# Patient Record
Sex: MALE | Race: WHITE | Employment: FULL TIME | URBAN - METROPOLITAN AREA
[De-identification: names, ages, dates, MRNs, and addresses within clinical notes are randomized per-mention and may not be internally consistent; named-entity substitution may affect disease eponyms.]

---

## 2017-06-20 ENCOUNTER — OFFICE VISIT (OUTPATIENT)
Dept: FAMILY MEDICINE | Facility: CLINIC | Age: 27
End: 2017-06-20
Payer: COMMERCIAL

## 2017-06-20 VITALS
OXYGEN SATURATION: 97 % | HEART RATE: 52 BPM | DIASTOLIC BLOOD PRESSURE: 80 MMHG | WEIGHT: 249.1 LBS | BODY MASS INDEX: 29.41 KG/M2 | HEIGHT: 77 IN | SYSTOLIC BLOOD PRESSURE: 120 MMHG | TEMPERATURE: 98.1 F

## 2017-06-20 DIAGNOSIS — Z13.6 CARDIOVASCULAR SCREENING; LDL GOAL LESS THAN 160: ICD-10-CM

## 2017-06-20 DIAGNOSIS — B35.6 TINEA CRURIS: ICD-10-CM

## 2017-06-20 DIAGNOSIS — L72.3 INFLAMED EPIDERMOID CYST OF SKIN: ICD-10-CM

## 2017-06-20 DIAGNOSIS — Z00.00 ENCOUNTER FOR ROUTINE ADULT HEALTH EXAMINATION WITHOUT ABNORMAL FINDINGS: Primary | ICD-10-CM

## 2017-06-20 PROCEDURE — 99385 PREV VISIT NEW AGE 18-39: CPT | Performed by: PHYSICIAN ASSISTANT

## 2017-06-20 PROCEDURE — 99212 OFFICE O/P EST SF 10 MIN: CPT | Mod: 25 | Performed by: PHYSICIAN ASSISTANT

## 2017-06-20 PROCEDURE — 80053 COMPREHEN METABOLIC PANEL: CPT | Performed by: PHYSICIAN ASSISTANT

## 2017-06-20 PROCEDURE — 36415 COLL VENOUS BLD VENIPUNCTURE: CPT | Performed by: PHYSICIAN ASSISTANT

## 2017-06-20 PROCEDURE — 80061 LIPID PANEL: CPT | Performed by: PHYSICIAN ASSISTANT

## 2017-06-20 RX ORDER — FLUCONAZOLE 150 MG/1
150 TABLET ORAL
Qty: 2 TABLET | Refills: 0 | Status: SHIPPED | OUTPATIENT
Start: 2017-06-20 | End: 2017-10-27

## 2017-06-20 NOTE — MR AVS SNAPSHOT
"              After Visit Summary   2017    Silas De Santiago    MRN: [id number]           Patient Information     Date Of Birth          1990        Visit Information        Provider Department      2017 11:20 AM Miki Alaniz PA-C [facility]        Today's Diagnoses     Encounter for routine adult health examination without abnormal findings    -  1    Inflamed epidermoid cyst of skin        Tinea cruris        CARDIOVASCULAR SCREENING; LDL GOAL LESS THAN 160           Follow-ups after your visit        Who to contact     If you have questions or need follow up information about today's clinic visit or your schedule please contact [facility] directly at [phone].  Normal or non-critical lab and imaging results will be communicated to you by MyChart, letter or phone within 4 business days after the clinic has received the results. If you do not hear from us within 7 days, please contact the clinic through MyChart or phone. If you have a critical or abnormal lab result, we will notify you by phone as soon as possible.  Submit refill requests through [employer] or call your pharmacy and they will forward the refill request to us. Please allow 3 business days for your refill to be completed.          Additional Information About Your Visit        MyChart Information     [employer] lets you send messages to your doctor, view your test results, renew your prescriptions, schedule appointments and more. To sign up, go to [email or web address] . Click on \"Log in\" on the left side of the screen, which will take you to the Welcome page. Then click on \"Sign up Now\" on the right side of the page.     You will be asked to enter the access code listed below, as well as some personal information. Please follow the directions to create your username and password.     Your access code is: [email or web address]  Expires: 2017 12:18 PM     Your access code will  in 90 days. If " "you need help or a new code, please call your [facility] clinic or [phone].        Care EveryWhere ID     This is your Care EveryWhere ID. This could be used by other organizations to access your [facility] medical records  [id number]        Your Vitals Were     Pulse Temperature Height Pulse Oximetry BMI (Body Mass Index)       52 98.1  F (36.7  C) (Oral) 6' 5\" (1.956 m) 97% 29.54 kg/m2        Blood Pressure from Last 3 Encounters:   06/20/17 120/80    Weight from Last 3 Encounters:   06/20/17 249 lb 1.6 oz (113 kg)              We Performed the Following     Comprehensive metabolic panel     Lipid panel reflex to direct LDL          Today's Medication Changes          These changes are accurate as of: 6/20/17 12:18 PM.  If you have any questions, ask your nurse or doctor.               Start taking these medicines.        Dose/Directions    fluconazole 150 MG tablet   Commonly known as:  DIFLUCAN   Used for:  Tinea cruris   Started by:  Miki Alaniz PA-C        Dose:  150 mg   Take 1 tablet (150 mg) by mouth every 3 days   Quantity:  2 tablet   Refills:  0            Where to get your medicines      These medications were sent to [facility][id number] [place] [address] [address] [address]     Phone:  [phone]     fluconazole 150 MG tablet                Primary Care Provider Office Phone # Fax #    Miki Alaniz PA-C [phone]       [facility] [address]  [facility] [address]        Thank you!     Thank you for choosing [facility]  for your care. Our goal is always to provide you with excellent care. Hearing back from our patients is one way we can continue to improve our services. Please take a few minutes to complete the written survey that you may receive in the mail after your visit with us. Thank you!             Your Updated Medication List - Protect others around you: Learn how to safely use, " store and throw away your medicines at www.disposemymeds.org.          This list is accurate as of: 6/20/17 12:18 PM.  Always use your most recent med list.                   Brand Name Dispense Instructions for use    fluconazole 150 MG tablet    DIFLUCAN    2 tablet    Take 1 tablet (150 mg) by mouth every 3 days

## 2017-06-20 NOTE — NURSING NOTE
"Chief Complaint   Patient presents with     Physical       Initial /80  Pulse 52  Temp 98.1  F (36.7  C) (Oral)  Ht 6' 5\" (1.956 m)  Wt 249 lb 1.6 oz (113 kg)  SpO2 97%  BMI 29.54 kg/m2 Estimated body mass index is 29.54 kg/(m^2) as calculated from the following:    Height as of this encounter: 6' 5\" (1.956 m).    Weight as of this encounter: 249 lb 1.6 oz (113 kg).  Medication Reconciliation: complete   Suresh Holliday CMA        "

## 2017-06-20 NOTE — PROGRESS NOTES
SUBJECTIVE:     CC: Silas De Santiago is an 27 year old male who presents for preventative health visit.     Physical   Annual:     Getting at least 3 servings of Calcium per day::  Yes    Bi-annual eye exam::  Yes    Dental care twice a year::  Yes    Sleep apnea or symptoms of sleep apnea::  None    Diet::  Regular (no restrictions)    Frequency of exercise::  4-5 days/week    Duration of exercise::  Greater than 60 minutes    Taking medications regularly::  Yes    Medication side effects::  Not applicable    Additional concerns today::  YES      Musculoskeletal problem/pain      Duration: 4 months    Description  Location: right shoulder/neck    Intensity:  mild    Accompanying signs and symptoms: lump about dime size that has started to grow in size    History  Previous similar problem: no   Previous evaluation:  none    Precipitating or alleviating factors:  Trauma or overuse: no   Aggravating factors include: none    Therapies tried and outcome: nothing    -Patient notes that he has a new lump along the right shoulder/neck  -At one point was around dime size; grown in size and is painful  -Denies any redness, there is no discharge    Also states he has had jock itch for 2 years has been treated but did not work for him.   He was treated with creams/lotions for a period of time - and then took an oral medication for an extended time        Today's PHQ-2 Score:   PHQ-2 ( 1999 Pf[employer]) 6/20/2017   Q1: Little interest or pleasure in doing things 0   Q2: Feeling down, depressed or hopeless 0   PHQ-2 Score 0   Q1: Little interest or pleasure in doing things Not at all   Q2: Feeling down, depressed or hopeless Not at all   PHQ-2 Score 0       Abuse: Current or Past(Physical, Sexual or Emotional)- No  Do you feel safe in your environment - Yes    Social History   Substance Use Topics     Smoking status: Never Smoker     Smokeless tobacco: Not on file     Alcohol use Yes     The patient does not drink >3 drinks per day  "nor >7 drinks per week.    Last PSA: No results found for: PSA    No results for input(s): CHOL, HDL, LDL, TRIG, CHOLHDLRATIO, NHDL in the last 58756 hours.    Reviewed orders with patient. Reviewed health maintenance and updated orders accordingly - Yes    Reviewed and updated as needed this visit by clinical staff  Tobacco  Allergies  Meds  Med Hx  Surg Hx  Fam Hx  Soc Hx        Reviewed and updated as needed this visit by Provider            ROS:  C: NEGATIVE for fever, chills, change in weight  INTEGUMENTARY/SKIN: lump along the right shoulder/neck; itching, erythematous groin  E: NEGATIVE for vision changes or irritation  ENT: NEGATIVE for ear, mouth and throat problems  R: NEGATIVE for significant cough or SOB  CV: NEGATIVE for chest pain, palpitations or peripheral edema  GI: NEGATIVE for nausea, abdominal pain, heartburn, or change in bowel habits   male: negative for dysuria, hematuria, decreased urinary stream, erectile dysfunction, urethral discharge  M: NEGATIVE for significant arthralgias or myalgia  N: NEGATIVE for weakness, dizziness or paresthesias  P: NEGATIVE for changes in mood or affect    Problem list, Medication list, Allergies, and Medical/Social/Surgical histories reviewed in [facility] and updated as appropriate.  OBJECTIVE:     /80  Pulse 52  Temp 98.1  F (36.7  C) (Oral)  Ht 6' 5\" (1.956 m)  Wt 249 lb 1.6 oz (113 kg)  SpO2 97%  BMI 29.54 kg/m2  EXAM:  GENERAL: healthy, alert and no distress  EYES: Eyes grossly normal to inspection, PERRL and conjunctivae and sclerae normal  HENT: ear canals and TM's normal, nose and mouth without ulcers or lesions  NECK: no adenopathy, no asymmetry, masses, or scars and thyroid normal to palpation  RESP: lungs clear to auscultation - no rales, rhonchi or wheezes  CV: regular rate and rhythm, normal S1 S2, no S3 or S4, no murmur, click or rub, no peripheral edema and peripheral pulses strong  ABDOMEN: soft, nontender, no hepatosplenomegaly, no " "masses and bowel sounds normal   (male): normal male genitalia without lesions or urethral discharge, no hernia  MS: no gross musculoskeletal defects noted, no edema  SKIN: there are erythematous patches along the bilateral groin space. Mild flaking at borders. Along the right posterior neck/shoulder line is a 15mm inflamed, tender, soft mass. No material is able to be expressed on palpitation.   PSYCH: mentation appears normal, affect normal/bright    ASSESSMENT/PLAN:     1. Encounter for routine adult health examination without abnormal findings  28yo male in stable health and as below.   - Lipid panel reflex to direct LDL  - Comprehensive metabolic panel    2. Inflamed epidermoid cyst of skin  I will have him come back tomorrow and see my partner KO to perform I&D. Procedure was thoroughly explained today.     3. Tinea cruris  Reviewed at home cares to help with control. Sounds like he has been treated with long term antifungal, im guessing terbinafine. As this has been not susceptible to topical tx, will trial diflucan x two. Screening LFT if have to move to stronger. Again, he needs to improve general cares as well.   - Comprehensive metabolic panel  - fluconazole (DIFLUCAN) 150 MG tablet; Take 1 tablet (150 mg) by mouth every 3 days  Dispense: 2 tablet; Refill: 0    4. CARDIOVASCULAR SCREENING; LDL GOAL LESS THAN 160  - Lipid panel reflex to direct LDL  - Comprehensive metabolic panel    COUNSELING:   Reviewed preventive health counseling, as reflected in patient instructions       Regular exercise       Healthy diet/nutrition         reports that he has never smoked. He does not have any smokeless tobacco history on file.    Estimated body mass index is 29.54 kg/(m^2) as calculated from the following:    Height as of this encounter: 6' 5\" (1.956 m).    Weight as of this encounter: 249 lb 1.6 oz (113 kg).   [facility]. Discussed lean eating and exercise - he is a regular " evans.     Counseling Resources:  ATP IV Guidelines  Pooled Cohorts Equation Calculator  FRAX Risk Assessment  ICSI Preventive Guidelines  Dietary Guidelines for Americans, 2010  USDA's MyPlate  ASA Prophylaxis  Lung CA Screening    Miki Alaniz PA-C  [facility]swers for HPI/ROS submitted by the patient on 6/20/2017   PHQ-2 Score: 0    Answers for HPI/ROS submitted by the patient on 6/20/2017   PHQ-2 Score: 0

## 2017-06-21 ENCOUNTER — OFFICE VISIT (OUTPATIENT)
Dept: FAMILY MEDICINE | Facility: CLINIC | Age: 27
End: 2017-06-21
Payer: COMMERCIAL

## 2017-06-21 VITALS
HEIGHT: 77 IN | DIASTOLIC BLOOD PRESSURE: 60 MMHG | TEMPERATURE: 98.6 F | BODY MASS INDEX: 29.4 KG/M2 | OXYGEN SATURATION: 98 % | WEIGHT: 249 LBS | RESPIRATION RATE: 18 BRPM | HEART RATE: 76 BPM | SYSTOLIC BLOOD PRESSURE: 128 MMHG

## 2017-06-21 DIAGNOSIS — Z23 ENCOUNTER FOR IMMUNIZATION: ICD-10-CM

## 2017-06-21 DIAGNOSIS — L02.11 ABSCESS OF NECK: Primary | ICD-10-CM

## 2017-06-21 LAB
ALBUMIN SERPL-MCNC: 4.7 G/DL (ref 3.4–5)
ALP SERPL-CCNC: 62 U/L (ref 40–150)
ALT SERPL W P-5'-P-CCNC: 25 U/L (ref 0–70)
ANION GAP SERPL CALCULATED.3IONS-SCNC: 8 MMOL/L (ref 3–14)
AST SERPL W P-5'-P-CCNC: 30 U/L (ref 0–45)
BILIRUB SERPL-MCNC: 0.6 MG/DL (ref 0.2–1.3)
BUN SERPL-MCNC: 11 MG/DL (ref 7–30)
CALCIUM SERPL-MCNC: 9.5 MG/DL (ref 8.5–10.1)
CHLORIDE SERPL-SCNC: 106 MMOL/L (ref 94–109)
CHOLEST SERPL-MCNC: 182 MG/DL
CO2 SERPL-SCNC: 27 MMOL/L (ref 20–32)
CREAT SERPL-MCNC: 0.86 MG/DL (ref 0.66–1.25)
GFR SERPL CREATININE-BSD FRML MDRD: NORMAL ML/MIN/1.7M2
GLUCOSE SERPL-MCNC: 86 MG/DL (ref 70–99)
HDLC SERPL-MCNC: 45 MG/DL
LDLC SERPL CALC-MCNC: 112 MG/DL
NONHDLC SERPL-MCNC: 137 MG/DL
POTASSIUM SERPL-SCNC: 3.9 MMOL/L (ref 3.4–5.3)
PROT SERPL-MCNC: 7.7 G/DL (ref 6.8–8.8)
SODIUM SERPL-SCNC: 141 MMOL/L (ref 133–144)
TRIGL SERPL-MCNC: 124 MG/DL

## 2017-06-21 PROCEDURE — 90715 TDAP VACCINE 7 YRS/> IM: CPT | Performed by: PHYSICIAN ASSISTANT

## 2017-06-21 PROCEDURE — 90471 IMMUNIZATION ADMIN: CPT | Performed by: PHYSICIAN ASSISTANT

## 2017-06-21 PROCEDURE — 87070 CULTURE OTHR SPECIMN AEROBIC: CPT | Performed by: PHYSICIAN ASSISTANT

## 2017-06-21 PROCEDURE — 87077 CULTURE AEROBIC IDENTIFY: CPT | Performed by: PHYSICIAN ASSISTANT

## 2017-06-21 PROCEDURE — 10060 I&D ABSCESS SIMPLE/SINGLE: CPT | Performed by: PHYSICIAN ASSISTANT

## 2017-06-21 PROCEDURE — 87186 SC STD MICRODIL/AGAR DIL: CPT | Performed by: PHYSICIAN ASSISTANT

## 2017-06-21 RX ORDER — SULFAMETHOXAZOLE/TRIMETHOPRIM 800-160 MG
1 TABLET ORAL 2 TIMES DAILY
Qty: 14 TABLET | Refills: 0 | Status: SHIPPED | OUTPATIENT
Start: 2017-06-21 | End: 2017-10-27

## 2017-06-21 NOTE — MR AVS SNAPSHOT
"              After Visit Summary   2017    Silas De Santiago    MRN: [id number]           Patient Information     Date Of Birth          1990        Visit Information        Provider Department      2017 2:50 PM Janey Wilson PA-C [facility]        Today's Diagnoses     Abscess of neck    -  1       Follow-ups after your visit        Who to contact     If you have questions or need follow up information about today's clinic visit or your schedule please contact [facility] directly at [phone].  Normal or non-critical lab and imaging results will be communicated to you by [employer], letter or phone within 4 business days after the clinic has received the results. If you do not hear from us within 7 days, please contact the clinic through [employer] or phone. If you have a critical or abnormal lab result, we will notify you by phone as soon as possible.  Submit refill requests through [employer] or call your pharmacy and they will forward the refill request to us. Please allow 3 business days for your refill to be completed.          Additional Information About Your Visit        MyChart Information     [employer] lets you send messages to your doctor, view your test results, renew your prescriptions, schedule appointments and more. To sign up, go to [email or web address] . Click on \"Log in\" on the left side of the screen, which will take you to the Welcome page. Then click on \"Sign up Now\" on the right side of the page.     You will be asked to enter the access code listed below, as well as some personal information. Please follow the directions to create your username and password.     Your access code is: [email or web address]  Expires: 2017 12:18 PM     Your access code will  in 90 days. If you need help or a new code, please call your [facility] or [phone].        Care EveryWhere ID     This is your Care EveryWhere ID. This could be used by other " "organizations to access your [facility] medical records  [id number]        Your Vitals Were     Pulse Temperature Respirations Height Pulse Oximetry BMI (Body Mass Index)    76 98.6  F (37  C) (Tympanic) 18 6' 5\" (1.956 m) 98% 29.53 kg/m2       Blood Pressure from Last 3 Encounters:   06/21/17 128/60   06/20/17 120/80    Weight from Last 3 Encounters:   06/21/17 249 lb (112.9 kg)   06/20/17 249 lb 1.6 oz (113 kg)              We Performed the Following     DRAIN SKIN ABSCESS SIMPLE/SINGLE     Wound Culture Aerobic Bacterial          Today's Medication Changes          These changes are accurate as of: 6/21/17  3:28 PM.  If you have any questions, ask your nurse or doctor.               Start taking these medicines.        Dose/Directions    sulfamethoxazole-trimethoprim 800-160 MG per tablet   Commonly known as:  BACTRIM DS/SEPTRA DS   Used for:  Abscess of neck   Started by:  Janey Wilson PA-C        Dose:  1 tablet   Take 1 tablet by mouth 2 times daily   Quantity:  14 tablet   Refills:  0            Where to get your medicines      These medications were sent to [facility][id number] - [place], [address] [address]     Phone:  [phone]     sulfamethoxazole-trimethoprim 800-160 MG per tablet                Primary Care Provider Office Phone # Fax #    Miki Sj Alaniz PA-C [phone]       [facility] [address] [facility] [address]        Equal Access to Services     [facility] AH: Hadii aad ku hadasho Soomaali, waaxda luqadaha, qaybta kaalmada adeegyada, waxay idiin haylyndon mejia. So [facility] [phone].    ATENCIÓN: Si habla español, tiene a chacon disposición servicios gratuitos de asistencia lingüística. Llame al [phone].    We comply with applicable federal civil rights laws and [place] laws. We do not discriminate on the basis of race, color, national origin, age, disability sex, sexual " orientation or gender identity.            Thank you!     Thank you for choosing [facility] [place]  for your care. Our goal is always to provide you with excellent care. Hearing back from our patients is one way we can continue to improve our services. Please take a few minutes to complete the written survey that you may receive in the mail after your visit with us. Thank you!             Your Updated Medication List - Protect others around you: Learn how to safely use, store and throw away your medicines at www.disposemymeds.org.          This list is accurate as of: 6/21/17  3:28 PM.  Always use your most recent med list.                   Brand Name Dispense Instructions for use Diagnosis    fluconazole 150 MG tablet    DIFLUCAN    2 tablet    Take 1 tablet (150 mg) by mouth every 3 days    Tinea cruris       sulfamethoxazole-trimethoprim 800-160 MG per tablet    BACTRIM DS/SEPTRA DS    14 tablet    Take 1 tablet by mouth 2 times daily    Abscess of neck

## 2017-06-21 NOTE — PROGRESS NOTES
"  SUBJECTIVE:                                                    Silas De Santiago is a 27 year old male who presents to clinic today for the following health issues:      Patient here today to discuss I & D for abscess on right shoulder and neck area.     No fever, no discharge.    Problem list and histories reviewed & adjusted, as indicated.  Additional history: as documented    There is no problem list on file for this patient.    Past Surgical History:   Procedure Laterality Date     HC SHOULDER ARTHROSCOPY, DX Right        Social History   Substance Use Topics     Smoking status: Never Smoker     Smokeless tobacco: Not on file     Alcohol use Yes     Family History   Problem Relation Age of Onset     Asthma Father      Childhood Only     Asthma Paternal Grandmother      Asthma Brother      Childhood Only         Current Outpatient Prescriptions   Medication Sig Dispense Refill     sulfamethoxazole-trimethoprim (BACTRIM DS/SEPTRA DS) 800-160 MG per tablet Take 1 tablet by mouth 2 times daily 14 tablet 0     fluconazole (DIFLUCAN) 150 MG tablet Take 1 tablet (150 mg) by mouth every 3 days 2 tablet 0     No Known Allergies    Reviewed and updated as needed this visit by clinical staff       Reviewed and updated as needed this visit by Provider         ROS:  Constitutional, HEENT, cardiovascular, pulmonary, gi and gu systems are negative, except as otherwise noted.    OBJECTIVE:                                                    /60 (BP Location: Right arm, Patient Position: Chair, Cuff Size: Adult Large)  Pulse 76  Temp 98.6  F (37  C) (Tympanic)  Resp 18  Ht 6' 5\" (1.956 m)  Wt 249 lb (112.9 kg)  SpO2 98%  BMI 29.53 kg/m2  Body mass index is 29.53 kg/(m^2).  GENERAL: healthy, alert and no distress  NECK: small nickel sized abscess posterior neck  RESP: lungs clear to auscultation - no rales, rhonchi or wheezes  CV: regular rate and rhythm, normal S1 S2, no S3 or S4, no murmur, click or rub, no " peripheral edema and peripheral pulses strong  MS: no gross musculoskeletal defects noted, no edema    Diagnostic Test Results:  No results found for this or any previous visit (from the past 24 hour(s)).     ASSESSMENT/PLAN:                                                            1. Abscess of neck  New problem, I/D performed today, would culture obtained.   Will cover with Bactrim BID for 7 days.  Advised f/u if signs or symptoms of infection.   - DRAIN SKIN ABSCESS SIMPLE/SINGLE  - Wound Culture Aerobic Bacterial  - sulfamethoxazole-trimethoprim (BACTRIM DS/SEPTRA DS) 800-160 MG per tablet; Take 1 tablet by mouth 2 times daily  Dispense: 14 tablet; Refill: 0    Risks, benefits and alternatives were discussed with patient. Agreeable to the plan of care.      Janey Wilson PA-C  [facility]

## 2017-06-21 NOTE — NURSING NOTE
Screening Questionnaire for Adult Immunization    Are you sick today?   No   Do you have allergies to medications, food, a vaccine component or latex?   No   Have you ever had a serious reaction after receiving a vaccination?   No   Do you have a long-term health problem with heart disease, lung disease, asthma, kidney disease, metabolic disease (e.g. diabetes), anemia, or other blood disorder?   No   Do you have cancer, leukemia, HIV/AIDS, or any other immune system problem?   No   In the past 3 months, have you taken medications that affect  your immune system, such as prednisone, other steroids, or anticancer drugs; drugs for the treatment of rheumatoid arthritis, Crohn s disease, or psoriasis; or have you had radiation treatments?   No   Have you had a seizure, or a brain or other nervous system problem?   No   During the past year, have you received a transfusion of blood or blood     products, or been given immune (gamma) globulin or antiviral drug?   No   For women: Are you pregnant or is there a chance you could become        pregnant during the next month?   No   Have you received any vaccinations in the past 4 weeks?   No     Immunization questionnaire answers were all negative.      MNVFC doesn't apply on this patient    Per orders of Janey Wilson PA-C, injection of Tdap Vaccine given by Angelica Loya. Patient instructed to remain in clinic for 20 minutes afterwards, and to report any adverse reaction to me immediately.       Screening performed by Angelica Loya on 6/21/2017 at 3:35 PM.    Prior to injection verified patient identity using patient's name and date of birth.    Sandra Loya CMA (AAMA) 6/21/2017 3:35 PM

## 2017-06-21 NOTE — NURSING NOTE
"Chief Complaint   Patient presents with     Abscess       Initial /60 (BP Location: Right arm, Patient Position: Chair, Cuff Size: Adult Large)  Pulse 76  Temp 98.6  F (37  C) (Tympanic)  Resp 18  Ht 6' 5\" (1.956 m)  Wt 249 lb (112.9 kg)  SpO2 98%  BMI 29.53 kg/m2 Estimated body mass index is 29.53 kg/(m^2) as calculated from the following:    Height as of this encounter: 6' 5\" (1.956 m).    Weight as of this encounter: 249 lb (112.9 kg).  Medication Reconciliation: complete     Patient would like to be notified at the following phone number for results from this visit   [phone] OK to leave message   Sandra Loya CMA (AAMA) 6/21/2017 3:15 PM      "

## 2017-06-21 NOTE — PROCEDURES
I/D of neck abscess:    Reviewed procedure with patient, consent obtained and signed into the chart.    Effected area cleaned with betadine x 3 then wiped away with alcohol.  1 percent Lidocaine with epineprhine used to infiltrate the area with good anethesia.  Number 11 scapel used to make a stab incision.  Purlent drainage was removed . Small amount of gauze was packed into the wound.   Appropraite wound care dressing applied.  Patient tolerated preocedure well without complication.    Wound culture was obtained.  Started patient on Bactrim BID x 7 days.    Advised f/u in clinic if signs or symptoms of infection occur.    Recheck on wound in clinic in 48 hours.    Janey Wilson PA-C

## 2017-06-23 ENCOUNTER — OFFICE VISIT (OUTPATIENT)
Dept: FAMILY MEDICINE | Facility: CLINIC | Age: 27
End: 2017-06-23
Payer: COMMERCIAL

## 2017-06-23 VITALS
WEIGHT: 258 LBS | DIASTOLIC BLOOD PRESSURE: 76 MMHG | HEIGHT: 77 IN | HEART RATE: 66 BPM | OXYGEN SATURATION: 97 % | BODY MASS INDEX: 30.46 KG/M2 | TEMPERATURE: 98.7 F | SYSTOLIC BLOOD PRESSURE: 116 MMHG

## 2017-06-23 DIAGNOSIS — L02.11 ABSCESS OF NECK: Primary | ICD-10-CM

## 2017-06-23 PROCEDURE — 99024 POSTOP FOLLOW-UP VISIT: CPT | Performed by: PHYSICIAN ASSISTANT

## 2017-06-23 NOTE — PROGRESS NOTES
"  SUBJECTIVE:                                                    Silas De Santiago is a 27 year old male who presents to clinic today for the following health issues:    Patient was seen for neck abscess on 6/21.   I&D'd by JACKIE  Did bleed pretty good the first day.  His wife cleaned the area and changed the bandage yesterday  Denies any further drainage and has no pain.   No fevers or chills    Problem list and histories reviewed & adjusted, as indicated.  Additional history: as documented    There is no problem list on file for this patient.    Past Surgical History:   Procedure Laterality Date     HC SHOULDER ARTHROSCOPY, DX Right        Social History   Substance Use Topics     Smoking status: Never Smoker     Smokeless tobacco: Not on file     Alcohol use Yes     Family History   Problem Relation Age of Onset     Asthma Father      Childhood Only     Asthma Paternal Grandmother      Asthma Brother      Childhood Only           Reviewed and updated as needed this visit by clinical staff  Tobacco  Allergies  Med Hx  Surg Hx  Fam Hx  Soc Hx      Reviewed and updated as needed this visit by Provider         ROS:  Constitutional, HEENT, cardiovascular, pulmonary, gi and gu systems are negative, except as otherwise noted.    OBJECTIVE:     /76  Pulse 66  Temp 98.7  F (37.1  C) (Oral)  Ht 6' 5\" (1.956 m)  Wt 258 lb (117 kg)  SpO2 97%  BMI 30.59 kg/m2  Body mass index is 30.59 kg/(m^2).  GENERAL: healthy, alert and no distress  SKIN: packing removed with some serosanguinous staining. The7mm wound edges were compressed with just a small amount of discharge noted, mostly blood. WOund edges are without erythema or warmth.     Diagnostic Test Results:  none - WOUND CULTURE results still pending    ASSESSMENT/PLAN:     1. Abscess of neck  This wound looks excellent today. Will leave open without packing. Wound care instructions were reviewed. I dont think he needs to return unless with worrisome symptoms. " Follow up per culture.    Miki Alaniz PA-C  [facility]

## 2017-06-23 NOTE — MR AVS SNAPSHOT
"              After Visit Summary   2017    Silas De Santiago    MRN: [id number]           Patient Information     Date Of Birth          1990        Visit Information        Provider Department      2017 4:40 PM M[facility]iz [facility]-C [facility]        Today's Diagnoses     Abscess of neck    -  1       Follow-ups after your visit        Who to contact     If you have questions or need follow up information about today's clinic visit or your schedule please contact [facility] directly at [phone].  Normal or non-critical lab and imaging results will be communicated to you by [employer] Directhart, letter or phone within 4 business days after the clinic has received the results. If you do not hear from us within 7 days, please contact the clinic through Local[employer] Directhart or phone. If you have a critical or abnormal lab result, we will notify you by phone as soon as possible.  Submit refill requests through [employer] or call your pharmacy and they will forward the refill request to us. Please allow 3 business days for your refill to be completed.          Additional Information About Your Visit        MyChart Information     [employer] lets you send messages to your doctor, view your test results, renew your prescriptions, schedule appointments and more. To sign up, go to [email or web address] . Click on \"Log in\" on the left side of the screen, which will take you to the Welcome page. Then click on \"Sign up Now\" on the right side of the page.     You will be asked to enter the access code listed below, as well as some personal information. Please follow the directions to create your username and password.     Your access code is: [email or web address]  Expires: 2017 12:18 PM     Your access code will  in 90 days. If you need help or a new code, please call your [facility] or [phone].        Care EveryWhere ID     This is your Care EveryWhere ID. This could be used by other " "organizations to access your [place] medical records  [id number]        Your Vitals Were     Pulse Temperature Height Pulse Oximetry BMI (Body Mass Index)       66 98.7  F (37.1  C) (Oral) 6' 5\" (1.956 m) 97% 30.59 kg/m2        Blood Pressure from Last 3 Encounters:   06/23/17 116/76   06/21/17 128/60   06/20/17 120/80    Weight from Last 3 Encounters:   06/23/17 258 lb (117 kg)   06/21/17 249 lb (112.9 kg)   06/20/17 249 lb 1.6 oz (113 kg)              Today, you had the following     No orders found for display       Primary Care Provider Office Phone # Fax #    Miki Alaniz PA-C [phone]       [facility] [address] [address] [facility] [address]        Equal Access to Services     [facility] : Hadii aad ku hadasho Soomaali, waaxda luqadaha, qaybta kaalmada adeegyada, waxay idiin hayaan adeeg kharafrancy garcia . So [facility] [phone].    ATENCIÓN: Si habla español, tiene a chacon disposición servicios gratuitos de asistencia lingüística. Llame al [phone].    We comply with applicable federal civil rights laws and [place] laws. We do not discriminate on the basis of race, color, national origin, age, disability sex, sexual orientation or gender identity.            Thank you!     Thank you for choosing [facility]  for your care. Our goal is always to provide you with excellent care. Hearing back from our patients is one way we can continue to improve our services. Please take a few minutes to complete the written survey that you may receive in the mail after your visit with us. Thank you!             Your Updated Medication List - Protect others around you: Learn how to safely use, store and throw away your medicines at www.disposemymeds.org.          This list is accurate as of: 6/23/17  5:07 PM.  Always use your most recent med list.                   Brand Name Dispense Instructions for use Diagnosis    fluconazole 150 MG tablet    DIFLUCAN    2 tablet "    Take 1 tablet (150 mg) by mouth every 3 days    Tinea cruris       sulfamethoxazole-trimethoprim 800-160 MG per tablet    BACTRIM DS/SEPTRA DS    14 tablet    Take 1 tablet by mouth 2 times daily    Abscess of neck

## 2017-06-23 NOTE — NURSING NOTE
"Chief Complaint   Patient presents with     Abscess       Initial /76  Pulse 66  Temp 98.7  F (37.1  C) (Oral)  Ht 6' 5\" (1.956 m)  Wt 258 lb (117 kg)  SpO2 97%  BMI 30.59 kg/m2 Estimated body mass index is 30.59 kg/(m^2) as calculated from the following:    Height as of this encounter: 6' 5\" (1.956 m).    Weight as of this encounter: 258 lb (117 kg).  Medication Reconciliation: complete   Suresh Holliday CMA        "

## 2017-06-25 LAB
BACTERIA SPEC CULT: ABNORMAL
MICRO REPORT STATUS: ABNORMAL
MICROORGANISM SPEC CULT: ABNORMAL
MICROORGANISM SPEC CULT: ABNORMAL
SPECIMEN SOURCE: ABNORMAL

## 2017-06-29 ENCOUNTER — DOCUMENTATION ONLY (OUTPATIENT)
Dept: FAMILY MEDICINE | Facility: CLINIC | Age: 27
End: 2017-06-29

## 2017-10-27 ENCOUNTER — OFFICE VISIT (OUTPATIENT)
Dept: FAMILY MEDICINE | Facility: CLINIC | Age: 27
End: 2017-10-27
Payer: COMMERCIAL

## 2017-10-27 VITALS
HEART RATE: 74 BPM | SYSTOLIC BLOOD PRESSURE: 122 MMHG | OXYGEN SATURATION: 97 % | HEIGHT: 77 IN | TEMPERATURE: 97.6 F | BODY MASS INDEX: 28.87 KG/M2 | WEIGHT: 244.5 LBS | DIASTOLIC BLOOD PRESSURE: 68 MMHG

## 2017-10-27 DIAGNOSIS — D17.0 LIPOMA OF NECK: ICD-10-CM

## 2017-10-27 DIAGNOSIS — M25.561 MEDIAL KNEE PAIN, RIGHT: Primary | ICD-10-CM

## 2017-10-27 PROCEDURE — 99213 OFFICE O/P EST LOW 20 MIN: CPT | Performed by: PHYSICIAN ASSISTANT

## 2017-10-27 RX ORDER — NAPROXEN 500 MG/1
500 TABLET ORAL 2 TIMES DAILY PRN
Qty: 30 TABLET | Refills: 0 | Status: SHIPPED | OUTPATIENT
Start: 2017-10-27 | End: 2021-09-17

## 2017-10-27 NOTE — PROGRESS NOTES
SUBJECTIVE:   Silas De Santiago is a 27 year old male who presents to clinic today for the following health issues:    Musculoskeletal problem/pain      Duration: 5 years    Description  Location: right knee    Intensity:  mild, moderate    Accompanying signs and symptoms: discoloration of knee, pain    History  Previous similar problem: YES- last 5 years  Previous evaluation:  ultrasound    Precipitating or alleviating factors:  Trauma or overuse: no   Aggravating factors include: stairs, running, prolonged walking    Therapies tried and outcome: ice and support wrap    -Patient notes that he has suffered from a 5 year hx of knee pain  -With the symptoms he also develops a knee bruise in the area of pain  -he cant recall a specific injury  -has had evaluated when in [employer], and with [facility]; did an U/S which was normal  -did do some physical therapy on his hips but it didn't help    -overall the pain is medial  -not constant; actually ran a marathon and was ok  -When it is flared, moving up stairs is very bad  -occasional weakness      ABscess      Duration: 4 months    Description (location/character/radiation): 4 months    Intensity:  mild    Accompanying signs and symptoms: change in size (dime)    History (similar episodes/previous evaluation): had one removed in June    Precipitating or alleviating factors: none    Therapies tried and outcome: None     -patient notes new lump along the posterior neckline  -it is very minimally tender  -thinks it is growing  -denies any bleeding or discharge  -had abscess drained previously this year    Problem list and histories reviewed & adjusted, as indicated.  Additional history: as documented    There is no problem list on file for this patient.    Past Surgical History:   Procedure Laterality Date     HC SHOULDER ARTHROSCOPY, DX Right     partial labral tear, debridement and arthroscopy (right)       Social History   Substance Use Topics     Smoking status: Never Smoker      "Smokeless tobacco: Never Used     Alcohol use Yes     Family History   Problem Relation Age of Onset     Asthma Father      Childhood Only     Asthma Paternal Grandmother      Asthma Brother      Childhood Only             Reviewed and updated as needed this visit by clinical staffTobacco  Allergies  Med Hx  Surg Hx  Fam Hx  Soc Hx      Reviewed and updated as needed this visit by Provider         ROS:  Constitutional, HEENT, cardiovascular, pulmonary, gi and gu systems are negative, except as otherwise noted.      OBJECTIVE:   /68 (BP Location: Right arm, Cuff Size: Adult Large)  Pulse 74  Temp 97.6  F (36.4  C) (Oral)  Ht 6' 5\" (1.956 m)  Wt 244 lb 8 oz (110.9 kg)  SpO2 97%  BMI 28.99 kg/m2  Body mass index is 28.99 kg/(m^2).  GENERAL: healthy, alert and no distress  MS: there is mild medial joint tenderness of the right knee. Very small bruising in the tender area. There is negative meniscal testing. No laxity of ligaments. He ambulates without limp. Strength is wnl  SKIN: small, dime sized rubbery lump, mobile, non tender, along the neckline posteriorly; no evidence for infection, redness or warmth    Diagnostic Test Results:  none     ASSESSMENT/PLAN:   1. Medial knee pain, right  Unclear etiology? Bursitis? Given the 5 year hx of intermittent symptoms would like ortho's take, and then consider physical therapy if tolerated. Begin first trial of regimented nsaids.  - ORTHO  REFERRAL  - naproxen (NAPROSYN) 500 MG tablet; Take 1 tablet (500 mg) by mouth 2 times daily as needed for moderate pain  Dispense: 30 tablet; Refill: 0    2. Lipoma of neck  On examination this does not appear to be repeat abscess but more likely irritated lipoma. We'll monitor for now, but if increasing or changing will refer to specialty for intervention     Miki Alaniz PA-C  [facility]  "

## 2017-10-27 NOTE — NURSING NOTE
"Chief Complaint   Patient presents with     Musculoskeletal Problem     Abscess       Initial /68 (BP Location: Right arm, Cuff Size: Adult Large)  Pulse 74  Temp 97.6  F (36.4  C) (Oral)  Ht 6' 5\" (1.956 m)  Wt 244 lb 8 oz (110.9 kg)  SpO2 97%  BMI 28.99 kg/m2 Estimated body mass index is 28.99 kg/(m^2) as calculated from the following:    Height as of this encounter: 6' 5\" (1.956 m).    Weight as of this encounter: 244 lb 8 oz (110.9 kg).  Medication Reconciliation: complete   Suresh Holliday CMA      "

## 2017-10-27 NOTE — MR AVS SNAPSHOT
After Visit Summary   10/27/2017    Silas De Santiago    MRN: [id number]           Patient Information     Date Of Birth          1990        Visit Information        Provider Department      10/27/2017 3:00 PM Miki Alaniz PA-C [facility]        Today's Diagnoses     Medial knee pain, right    -  1    Lipoma of neck           Follow-ups after your visit        Additional Services     ORTHO  REFERRAL       Coverage of these services is subject to the terms and limitations of your health insurance plan. Please call member services at your health plan with any benefit or coverage questions.       [facility] is referring you to the Orthopedic  Services at [facility].       The  representative will assist you in the coordination of your orthopedic and musculoskeletal care as prescribed by your physician.    The  representative will call you within 24 hours or   You may contact the  representative at:   [phone] for [place] and [facility]  [phone] for [facility], and [facility]  [phone] for [facility]    Type of Referral : Non Surgical       Timeframe requested: Routine       If X-rays, CT or MRI's   have been performed, please contact the facility where they were done, to arrange for  prior to your scheduled appointment. Please bring this referral request to your appointment and present it to your specialist.                  Who to contact     If you have questions or need follow up information about today's clinic visit or your schedule please contact [facility] directly at [phone].  Normal or non-critical lab and imaging results will be communicated to you by MyChart, letter or phone within 4 business days after the clinic has received the results. If you do not hear from us within 7 days, please contact the clinic through MyChart or phone.  "If you have a critical or abnormal lab result, we will notify you by phone as soon as possible.  Submit refill requests through [employer] or call your pharmacy and they will forward the refill request to us. Please allow 3 business days for your refill to be completed.          Additional Information About Your Visit        Iss[employer] Solutions[employer] Information     [employer] lets you send messages to your doctor, view your test results, renew your prescriptions, schedule appointments and more. To sign up, go to [email or web address] . Click on \"Log in\" on the left side of the screen, which will take you to the Welcome page. Then click on \"Sign up Now\" on the right side of the page.     You will be asked to enter the access code listed below, as well as some personal information. Please follow the directions to create your username and password.     Your access code is: [email or web address]  Expires: 2018  3:50 PM     Your access code will  in 90 days. If you need help or a new code, please call your [place] clinic or [phone].        Care EveryWhere ID     This is your Care EveryWhere ID. This could be used by other organizations to access your [place] medical records  [id number]        Your Vitals Were     Pulse Temperature Height Pulse Oximetry BMI (Body Mass Index)       74 97.6  F (36.4  C) (Oral) 6' 5\" (1.956 m) 97% 28.99 kg/m2        Blood Pressure from Last 3 Encounters:   10/27/17 122/68   17 116/76   17 128/60    Weight from Last 3 Encounters:   10/27/17 244 lb 8 oz (110.9 kg)   17 258 lb (117 kg)   17 249 lb (112.9 kg)              We Performed the Following     ORTHO  REFERRAL          Today's Medication Changes          These changes are accurate as of: 10/27/17  3:50 PM.  If you have any questions, ask your nurse or doctor.               Stop taking these medicines if you haven't already. Please contact your care team if you have questions.     fluconazole 150 MG tablet "   Commonly known as:  DIFLUCAN   Stopped by:  Miki Alaniz PA-C           sulfamethoxazole-trimethoprim 800-160 MG per tablet   Commonly known as:  BACTRIM DS/SEPTRA DS   Stopped by:  Miki Alaniz PA-C                    Primary Care Provider Office Phone # Fax #    Miki Alaniz PA-C [phone]       [address] [facility] [address]        Equal Access to Services     [facility]: Hadii aad ku hadasho Soomaali, waaxda luqadaha, qaybta kaalmada adeegyada, waxay idiin hayaan adeeg kharash la'aan . So [facility] [phone].    ATENCIÓN: Si habla español, tiene a chacon disposición servicios gratuitos de asistencia lingüística. Llame al [phone].    We comply with applicable federal civil rights laws and [place] laws. We do not discriminate on the basis of race, color, national origin, age, disability, sex, sexual orientation, or gender identity.            Thank you!     Thank you for choosing [facility]  for your care. Our goal is always to provide you with excellent care. Hearing back from our patients is one way we can continue to improve our services. Please take a few minutes to complete the written survey that you may receive in the mail after your visit with us. Thank you!             Your Updated Medication List - Protect others around you: Learn how to safely use, store and throw away your medicines at www.disposemymeds.org.      Notice  As of 10/27/2017  3:50 PM    You have not been prescribed any medications.

## 2017-11-06 ENCOUNTER — RADIANT APPOINTMENT (OUTPATIENT)
Dept: GENERAL RADIOLOGY | Facility: CLINIC | Age: 27
End: 2017-11-06
Attending: PHYSICAL MEDICINE & REHABILITATION
Payer: COMMERCIAL

## 2017-11-06 ENCOUNTER — OFFICE VISIT (OUTPATIENT)
Dept: ORTHOPEDICS | Facility: CLINIC | Age: 27
End: 2017-11-06
Payer: COMMERCIAL

## 2017-11-06 VITALS — RESPIRATION RATE: 13 BRPM | WEIGHT: 240 LBS | HEIGHT: 77 IN | BODY MASS INDEX: 28.34 KG/M2

## 2017-11-06 DIAGNOSIS — M25.561 RIGHT MEDIAL KNEE PAIN: ICD-10-CM

## 2017-11-06 DIAGNOSIS — M25.561 CHRONIC PAIN OF RIGHT KNEE: Primary | ICD-10-CM

## 2017-11-06 DIAGNOSIS — G89.29 CHRONIC PAIN OF RIGHT KNEE: Primary | ICD-10-CM

## 2017-11-06 PROCEDURE — 99204 OFFICE O/P NEW MOD 45 MIN: CPT | Performed by: PHYSICAL MEDICINE & REHABILITATION

## 2017-11-06 PROCEDURE — 73562 X-RAY EXAM OF KNEE 3: CPT | Performed by: PHYSICAL MEDICINE & REHABILITATION

## 2017-11-06 NOTE — Clinical Note
Dear Silas Styles saw me at [facility] on Nov 6, 2017.  Please refer to the visit note at your convenience and feel free to contact me should you have any questions.  Sincerely,  Kolton Berman DO, [facility]

## 2017-11-06 NOTE — MR AVS SNAPSHOT
After Visit Summary   11/6/2017    Silas De Santiago    MRN: [id number]           Patient Information     Date Of Birth          1990        Visit Information        Provider Department      11/6/2017 5:00 PM Kolton Berman DO [facility]        Today's Diagnoses     Chronic pain of right knee    -  1      Care Instructions    We addressed the following today:    1. Right knee pain    Activity modification as discussed  Topical Treatments: Ice  Over the counter medication: Acetaminophen (Tylenol) 1000 mg every 6 hours with food (Maximum of 3000 mg/day)  Ibuprofen (Advil) maximum of 800 mg four times a day with food  MRI at [facility] - call [phone] to schedule  Follow up 1-2 business days after completion of further diagnostic imaging for discussion of results/further medical care (sooner if needed; call direct clinic number [[phone]] at any time with questions or concerns)              Follow-ups after your visit        Future tests that were ordered for you today     Open Future Orders        Priority Expected Expires Ordered    MR Knee Right w/o Contrast Routine  11/6/2018 11/6/2017            Who to contact     If you have questions or need follow up information about today's clinic visit or your schedule please contact [facility] directly at [phone].  Normal or non-critical lab and imaging results will be communicated to you by MyChart, letter or phone within 4 business days after the clinic has received the results. If you do not hear from us within 7 days, please contact the clinic through MyChart or phone. If you have a critical or abnormal lab result, we will notify you by phone as soon as possible.  Submit refill requests through [employer] or call your pharmacy and they will forward the refill request to us. Please allow 3 business days for your refill to be completed.          Additional Information About Your Visit        [facility]  "Information     Auth0 lets you send messages to your doctor, view your test results, renew your prescriptions, schedule appointments and more. To sign up, go to [email or web address] . Click on \"Log in\" on the left side of the screen, which will take you to the Welcome page. Then click on \"Sign up Now\" on the right side of the page.     You will be asked to enter the access code listed below, as well as some personal information. Please follow the directions to create your username and password.     Your access code is: [email or web address]  Expires: 2018  2:50 PM     Your access code will  in 90 days. If you need help or a new code, please call your [facility] clinic or [phone].        Care EveryWhere ID     This is your Care EveryWhere ID. This could be used by other organizations to access your [facility] medical records  [id number]        Your Vitals Were     Respirations Height BMI (Body Mass Index)             13 6' 5\" (1.956 m) 28.46 kg/m2          Blood Pressure from Last 3 Encounters:   10/27/17 122/68   17 116/76   17 128/60    Weight from Last 3 Encounters:   17 240 lb (108.9 kg)   10/27/17 244 lb 8 oz (110.9 kg)   17 258 lb (117 kg)               Primary Care Provider Office Phone # Fax #    Miki Sj Alaniz PA-C [phone]       [address] [facility] [address]        Equal Access to Services     [facility]: Hadii fabiola ku hadasho Soomaali, waaxda luqadaha, qaybta kaalmada kojo gatica . So [facility] [phone].    ATENCIÓN: Si habla español, tiene a chacon disposición servicios gratuitos de asistencia lingüística. Llame al [phone].    We comply with applicable federal civil rights laws and [place] laws. We do not discriminate on the basis of race, color, national origin, age, disability, sex, sexual orientation, or gender identity.            Thank you!     [facility] you for [facility]  for your care. Our goal is always to provide you with excellent care. Hearing back from our patients is one way we can continue to improve our services. Please take a few minutes to complete the written survey that you may receive in the mail after your visit with us. Thank you!             Your Updated Medication List - Protect others around you: Learn how to safely use, store and throw away your medicines at www.disposemymeds.org.          This list is accurate as of: 11/6/17  6:13 PM.  Always use your most recent med list.                   Brand Name Dispense Instructions for use Diagnosis    naproxen 500 MG tablet    NAPROSYN    30 tablet    Take 1 tablet (500 mg) by mouth 2 times daily as needed for moderate pain    Medial knee pain, right

## 2017-11-06 NOTE — NURSING NOTE
"Chief Complaint   Patient presents with     Musculoskeletal Problem     right knee pain       Initial Resp (!) 113  Ht 6' 5\" (1.956 m)  Wt 240 lb (108.9 kg)  BMI 28.46 kg/m2 Estimated body mass index is 28.46 kg/(m^2) as calculated from the following:    Height as of this encounter: 6' 5\" (1.956 m).    Weight as of this encounter: 240 lb (108.9 kg).  Medication Reconciliation: complete       Louie Hager, ATC    "

## 2017-11-06 NOTE — LETTER
"    11/6/2017         RE: Silas De Santiago  [address] [facility] [address]        Dear Colleague,    Thank you for referring your patient, [facility]. Please see a copy of my visit note below.     [facility]   Clinic Visit s Nov 6, 2017    Subjective:  Silas De Santiago is a 27 year old male who is seen in consultation at the request of  Alaniz for evaluation of chronic right knee pain (10+ minutes late for his appointment).    Symptoms began 5+ years ago.  Reports insidious onset without acute precipitating event.  Reports dull right knee pain that is located medial/anterior with radiation absent.  Pain is 5/10 in maximal severity and 0/10 currently.  Symptoms are worse with running, prolonged walking, and with stairs and better with rest.  Other treatment has consisted of ice occasionally and Aleve occasionally with minimal relief.  Denies any consistent weakness, locking, popping, catching, clicking, or swelling of the right knee. Notes bruising of the right knee.  Denies any previous right knee injuries/surgeries.    Patient's past medical, surgical, social, and family histories are reviewed today.  No pertinent significant past medical history  Past Medical History:   Diagnosis Date     NO ACTIVE PROBLEMS        Review of Systems:  Constitutional: NEGATIVE for fever, chills, or change in weight  Skin: NEGATIVE for worrisome rashes, moles, or lesions  Neuro: NEGATIVE for weakness of the right lower extremity  MSK: see HPI  Additional 10 point ROS is negative other than symptoms noted above and in HPI    Objective:  Resp 13  Ht 6' 5\" (1.956 m)  Wt 240 lb (108.9 kg)  BMI 28.46 kg/m2  General: healthy, alert, and in no distress  Skin: no suspicious lesions or rashes  Psych: mentation appears normal and affect normal/bright  HEENT: no scleral icterus  CV: no pedal edema  Resp: normal respiratory effort without conversational dyspnea " LMTCB   Neuro: motor strength as noted below  Lymph: no palpable lymphadenopathy    MSK:    RIGHT KNEE  Inspection:    Bruising present of the medial knee region without erythema  Palpation:    Not tender over the patella tendon, quadriceps insertion, lateral joint line, medial joint line, medial tibial plateau, lateral tibial plateau, medial femoral condyle, lateral femoral condyle, or prepatellar bursa    No effusion is present    Patellofemoral crepitus is absent  Passive Range of Motion:     00 extension to 1350 flexion    Strength:    Quadriceps 5/5; extensor mechanism intact  Special Tests:    Positive: None    Negative: Patellar grind, patellar apprehension, MCL/valgus stress (0 & 30 deg), LCL/varus stress (0 & 30 deg), Lachman's, ballottement, posterior sag, quadriceps active test, Awilda's, Apley's, Thessaly's, single leg squatting, and bounce home    Contralateral knee: no overlying skin change, observable deformity, or effusion    Imaging:  Right knee x-rays (3 views, Parson/sunrise/lateral) were ordered, independent visualization of images was performed, and interpreted in the office today  Impression:   1. Fabella noted of the posterior portion of the right knee.  2. Enthesophyte noted of the superior portion of the right patella.  3. Possible narrowing of the right patellofemoral joint.  4. Negative for fracture, subluxation, or other acute osseous abnormalities.    ASSESSMENT:  1. Chronic right knee pain - differential diagnoses includes plica, medial meniscus tear, chondromalacia patella, etc.    PLAN:  1. MRI of the right knee without contrast at [facility] for further evaluation of current medical state.  2. Activity modification as discussed, including limitation of activities that cause pain/discomfort.  3. Acetaminophen/Ibuprofen/ice as needed for improved pain control.  4. Follow up 1-2 business days after completion of further diagnostic imaging for discussion of results/further medical care.   Instructed to contact our office should the condition evolve or worsen.    Patient's conditions were thoroughly discussed during today's visit with greater than 50% of the visit spent counseling the patient with total time spent face-to-face with the patient being 15 minutes.    Kolton Berman DO, [facility]    Disclaimer: This note consists of symbols derived from keyboarding, dictation and/or voice recognition software. As a result, there may be errors in the script that have gone undetected. Please consider this when interpreting information found in this chart.          Again, thank you for allowing me to participate in the care of your patient.        Sincerely,        Kolton Berman DO

## 2017-11-06 NOTE — PROGRESS NOTES
" [facility]   Clinic Visit s Nov 6, 2017    Subjective:  Silas De Santiago is a 27 year old male who is seen in consultation at the request of  Corbin for evaluation of chronic right knee pain (10+ minutes late for his appointment).    Symptoms began 5+ years ago.  Reports insidious onset without acute precipitating event.  Reports dull right knee pain that is located medial/anterior with radiation absent.  Pain is 5/10 in maximal severity and 0/10 currently.  Symptoms are worse with running, prolonged walking, and with stairs and better with rest.  Other treatment has consisted of ice occasionally and Aleve occasionally with minimal relief.  Denies any consistent weakness, locking, popping, catching, clicking, or swelling of the right knee. Notes bruising of the right knee.  Denies any previous right knee injuries/surgeries.    Patient's past medical, surgical, social, and family histories are reviewed today.  No pertinent significant past medical history  Past Medical History:   Diagnosis Date     NO ACTIVE PROBLEMS        Review of Systems:  Constitutional: NEGATIVE for fever, chills, or change in weight  Skin: NEGATIVE for worrisome rashes, moles, or lesions  Neuro: NEGATIVE for weakness of the right lower extremity  MSK: see HPI  Additional 10 point ROS is negative other than symptoms noted above and in HPI    Objective:  Resp 13  Ht 6' 5\" (1.956 m)  Wt 240 lb (108.9 kg)  BMI 28.46 kg/m2  General: healthy, alert, and in no distress  Skin: no suspicious lesions or rashes  Psych: mentation appears normal and affect normal/bright  HEENT: no scleral icterus  CV: no pedal edema  Resp: normal respiratory effort without conversational dyspnea   Neuro: motor strength as noted below  Lymph: no palpable lymphadenopathy    MSK:    RIGHT KNEE  Inspection:    Bruising present of the medial knee region without erythema  Palpation:    Not tender over the patella tendon, quadriceps insertion, " lateral joint line, medial joint line, medial tibial plateau, lateral tibial plateau, medial femoral condyle, lateral femoral condyle, or prepatellar bursa    No effusion is present    Patellofemoral crepitus is absent  Passive Range of Motion:     00 extension to 1350 flexion    Strength:    Quadriceps 5/5; extensor mechanism intact  Special Tests:    Positive: None    Negative: Patellar grind, patellar apprehension, MCL/valgus stress (0 & 30 deg), LCL/varus stress (0 & 30 deg), Lachman's, ballottement, posterior sag, quadriceps active test, Awilda's, Apley's, Thessaly's, single leg squatting, and bounce home    Contralateral knee: no overlying skin change, observable deformity, or effusion    Imaging:  Right knee x-rays (3 views, Parson/sunrise/lateral) were ordered, independent visualization of images was performed, and interpreted in the office today  Impression:   1. Fabella noted of the posterior portion of the right knee.  2. Enthesophyte noted of the superior portion of the right patella.  3. Possible narrowing of the right patellofemoral joint.  4. Negative for fracture, subluxation, or other acute osseous abnormalities.    ASSESSMENT:  1. Chronic right knee pain - differential diagnoses includes plica, medial meniscus tear, chondromalacia patella, etc.    PLAN:  1. MRI of the right knee without contrast at [facility] for further evaluation of current medical state.  2. Activity modification as discussed, including limitation of activities that cause pain/discomfort.  3. Acetaminophen/Ibuprofen/ice as needed for improved pain control.  4. Follow up 1-2 business days after completion of further diagnostic imaging for discussion of results/further medical care.  Instructed to contact our office should the condition evolve or worsen.    Patient's conditions were thoroughly discussed during today's visit with greater than 50% of the visit spent counseling the patient with total time spent face-to-face with the  patient being 15 minutes.    Kolton Berman DO, CAQSM  [facility]    Disclaimer: This note consists of symbols derived from keyboarding, dictation and/or voice recognition software. As a result, there may be errors in the script that have gone undetected. Please consider this when interpreting information found in this chart.

## 2017-11-07 NOTE — PATIENT INSTRUCTIONS
We addressed the following today:    1. Right knee pain    Activity modification as discussed  Topical Treatments: Ice  Over the counter medication: Acetaminophen (Tylenol) 1000 mg every 6 hours with food (Maximum of 3000 mg/day)  Ibuprofen (Advil) maximum of 800 mg four times a day with food  MRI at [facility] - call [phone] to schedule  Follow up 1-2 business days after completion of further diagnostic imaging for discussion of results/further medical care (sooner if needed; call direct clinic number [[phone]] at any time with questions or concerns)

## 2017-11-15 ENCOUNTER — TRANSFERRED RECORDS (OUTPATIENT)
Dept: HEALTH INFORMATION MANAGEMENT | Facility: CLINIC | Age: 27
End: 2017-11-15

## 2017-11-27 ENCOUNTER — OFFICE VISIT (OUTPATIENT)
Dept: ORTHOPEDICS | Facility: CLINIC | Age: 27
End: 2017-11-27
Payer: COMMERCIAL

## 2017-11-27 VITALS — WEIGHT: 240 LBS | RESPIRATION RATE: 12 BRPM | BODY MASS INDEX: 28.34 KG/M2 | HEIGHT: 77 IN

## 2017-11-27 DIAGNOSIS — M67.51 PLICA OF KNEE, RIGHT: ICD-10-CM

## 2017-11-27 DIAGNOSIS — M25.561 RIGHT MEDIAL KNEE PAIN: Primary | ICD-10-CM

## 2017-11-27 DIAGNOSIS — M23.303 DEGENERATION DISEASE OF MEDIAL MENISCUS OF RIGHT KNEE: ICD-10-CM

## 2017-11-27 PROCEDURE — 99213 OFFICE O/P EST LOW 20 MIN: CPT | Performed by: PHYSICAL MEDICINE & REHABILITATION

## 2017-11-27 NOTE — MR AVS SNAPSHOT
After Visit Summary   11/27/2017    Silas De Santiago    MRN: [id number]           Patient Information     Date Of Birth          1990        Visit Information        Provider Department      11/27/2017 4:20 PM Kolton Berman DO [facility]        Today's Diagnoses     Right medial knee pain    -  1      Care Instructions    We addressed the following today:    1. Right medial knee pain    Activity modification as discussed  Physical therapy: [facility] - [phone]  Topical Treatments: Ice  Over the counter medication: Acetaminophen (Tylenol) 1000 mg every 6 hours with food (Maximum of 3000 mg/day)  Ibuprofen (Advil) maximum of 800 mg four times a day with food  Follow up in 6 weeks if no improvement of symptoms for further evaluation/medical care (sooner if needed; call direct clinic number [[phone]] at any time with questions or concerns)              Follow-ups after your visit        Additional Services     [facility] PT, HAND, AND CHIROPRACTIC REFERRAL       **This order will print in the [facility] Scheduling Office**    Physical Therapy, Hand Therapy and Chiropractic Care are available through:    *[facility]  *[facility]  *[facility]    Call one number to schedule at any of the above locations: [phone].    Your provider has referred you to: Physical Therapy at [facility] or [facility]    Indication/Reason for Referral: Knee Pain  Onset of Illness: Years  Therapy Orders: Evaluate and Treat  Special Programs: None  Special Request: None    Mark Flores      Additional Comments for the Therapist or Chiropractor: Formal physical therapy - exercises to include closed kinetic chain VMO strengthening, hip abductor/adductor/external rotator/internal rotator strengthening, IT band, quadriceps, and hamstring stretching, and core strengthening with use of modalities/taping as appropriate with home  "exercise prescription.     Please be aware that coverage of these services is subject to the terms and limitations of your health insurance plan.  Call member services at your health plan with any benefit or coverage questions.      Please bring the following to your appointment:    *Your personal calendar for scheduling future appointments  *Comfortable clothing                  Who to contact     If you have questions or need follow up information about today's clinic visit or your schedule please contact [facility]IC[facility] directly at [phone].  Normal or non-critical lab and imaging results will be communicated to you by GTxhart, letter or phone within 4 business days after the clinic has received the results. If you do not hear from us within 7 days, please contact the clinic through [employer] or phone. If you have a critical or abnormal lab result, we will notify you by phone as soon as possible.  Submit refill requests through [employer] or call your pharmacy and they will forward the refill request to us. Please allow 3 business days for your refill to be completed.          Additional Information About Your Visit        [employer] Information     [employer] lets you send messages to your doctor, view your test results, renew your prescriptions, schedule appointments and more. To sign up, go to [email or web address] . Click on \"Log in\" on the left side of the screen, which will take you to the Welcome page. Then click on \"Sign up Now\" on the right side of the page.     You will be asked to enter the access code listed below, as well as some personal information. Please follow the directions to create your username and password.     Your access code is: [email or web address]  Expires: 2018  2:50 PM     Your access code will  in 90 days. If you need help or a new code, please call your [place] clinic or [phone].        Care EveryWhere ID     This is your Care EveryWhere ID. This could be used " "by other organizations to access your [facility] medical records  [id number]        Your Vitals Were     Respirations Height BMI (Body Mass Index)             12 6' 5\" (1.956 m) 28.46 kg/m2          Blood Pressure from Last 3 Encounters:   10/27/17 122/68   06/23/17 116/76   06/21/17 128/60    Weight from Last 3 Encounters:   11/27/17 240 lb (108.9 kg)   11/06/17 240 lb (108.9 kg)   10/27/17 244 lb 8 oz (110.9 kg)              We Performed the Following     [facility], HAND, AND CHIROPRACTIC REFERRAL        Primary Care Provider Office Phone # Fax #    Mkii Alaniz PA-C [phone] [address] [address] [facility] [address]        Equal Access to Services     [facility] KWASI : Hadii aad ku hadasho Soomaali, waaxda luqadaha, qaybta kaalmada adeegyada, kojo garcia . So [facility] [phone].    ATENCIÓN: Si habla español, tiene a chacon disposición servicios gratuitos de asistencia lingüística. Jimbo al [phone].    We comply with applicable federal civil rights laws and [place] laws. We do not discriminate on the basis of race, color, national origin, age, disability, sex, sexual orientation, or gender identity.            Thank you!     Thank you for choosing [facility]  for your care. Our goal is always to provide you with excellent care. Hearing back from our patients is one way we can continue to improve our services. Please take a few minutes to complete the written survey that you may receive in the mail after your visit with us. Thank you!             Your Updated Medication List - Protect others around you: Learn how to safely use, store and throw away your medicines at www.disposemymeds.org.          This list is accurate as of: 11/27/17  5:10 PM.  Always use your most recent med list.                   Brand Name Dispense Instructions for use Diagnosis    naproxen 500 MG tablet    NAPROSYN    30 tablet    Take 1 tablet (500 mg) by mouth 2 " times daily as needed for moderate pain    Medial knee pain, right

## 2017-11-27 NOTE — PROGRESS NOTES
" [facility]   Follow-up Visit s Nov 27, 2017    Subjective:  Silas De Santiago is a 27 year old male who is seen in follow up for evaluation of right medial knee pain for discussion of MRI of the right knee results from [facility].  Last visit was on 11/6/2017.  Since that time, symptoms have been unchanged.    Notes right medial knee pain. Denies any weakness, locking, popping, catching, clicking, or swelling of the right knee. Notes bruising of the right knee. Denies any trauma/falls since last clinical visit.    Patient's past medical, surgical, social, and family histories are reviewed today    Objective:  Resp 12  Ht 6' 5\" (1.956 m)  Wt 240 lb (108.9 kg)  BMI 28.46 kg/m2  General: healthy, alert, and in no distress    Imaging:  No x-rays indicated during today's visit  Outside films from [facility] were reviewed today, independent visualization of images was performed, and results were discussed with the patient  MRI EXAMINATION OF THE RIGHT KNEE - 11/16/2017   CONCLUSION:   1. Mild intrasubstance signal within the posterior horn medial meniscus, but without evidence for a medial meniscus tear. This may be related to mild intrasubstance mucoid degeneration.  2. A medial patella plica is present. This appears slightly irregular and may be minimally thickened as well.  3. Articular cartilage of the patellofemoral joint and elsewhere of the knee is normally preserved.  4. No abnormal knee joint effusion. No evidence for bursitis about the knee.  5. No other evidence for internal derangement.     ASSESSMENT:  1. Right medial knee pain - differential diagnoses includes patellofemoral pain syndrome, etc.  2. Right knee plica  3. Right medial meniscus degeneration    PLAN:  1. Formal physical therapy - exercises to include closed kinetic chain VMO strengthening, hip abductor/adductor/external rotator/internal rotator strengthening, IT band, quadriceps, and hamstring stretching, and core strengthening with " use of modalities/taping as appropriate with home exercise prescription.  2. Activity modification as discussed, including limitation of activities that cause pain/discomfort.  3. Acetaminophen/Ibuprofen/ice as needed for improved pain control.  4. Follow-up in 6 weeks for further evaluation/medical care.  If asymptomatic, can follow-up as needed.  Consider referral to orthopedic surgery for consideration of a right knee arthroscopy, a right knee brace, etc. as deemed appropriate moving forward. Instructed to follow-up if change of symptoms arise.    Patient's conditions were thoroughly discussed during today's visit with greater than 50% of the visit spent counseling the patient with total time spent face-to-face with the patient being 15 minutes.    Kolton Berman DO, [facility]  [facility]    Disclaimer: This note consists of symbols derived from keyboarding, dictation and/or voice recognition software. As a result, there may be errors in the script that have gone undetected. Please consider this when interpreting information found in this chart.

## 2017-11-27 NOTE — LETTER
"    11/27/2017         RE: Silas De Santiago  [address]  [facility] [address]        Dear Colleague,    Thank you for referring your patient, Silas De Santiago, to the [facility]. Please see a copy of my visit note below.     [facility]   Follow-up Visit s Nov 27, 2017    Subjective:  Silas De Santiago is a 27 year old male who is seen in follow up for evaluation of right medial knee pain for discussion of MRI of the right knee results from [facility].  Last visit was on 11/6/2017.  Since that time, symptoms have been unchanged.    Notes right medial knee pain. Denies any weakness, locking, popping, catching, clicking, or swelling of the right knee. Notes bruising of the right knee. Denies any trauma/falls since last clinical visit.    Patient's past medical, surgical, social, and family histories are reviewed today    Objective:  Resp 12  Ht 6' 5\" (1.956 m)  Wt 240 lb (108.9 kg)  BMI 28.46 kg/m2  General: healthy, alert, and in no distress    Imaging:  No x-rays indicated during today's visit  Outside films from [facility] were reviewed today, independent visualization of images was performed, and results were discussed with the patient  MRI EXAMINATION OF THE RIGHT KNEE - 11/16/2017   CONCLUSION:   1. Mild intrasubstance signal within the posterior horn medial meniscus, but without evidence for a medial meniscus tear. This may be related to mild intrasubstance mucoid degeneration.  2. A medial patella plica is present. This appears slightly irregular and may be minimally thickened as well.  3. Articular cartilage of the patellofemoral joint and elsewhere of the knee is normally preserved.  4. No abnormal knee joint effusion. No evidence for bursitis about the knee.  5. No other evidence for internal derangement.     ASSESSMENT:  1. Right medial knee pain - differential diagnoses includes patellofemoral pain syndrome, etc.  2. Right knee plica  3. Right medial meniscus " degeneration    PLAN:  1. Formal physical therapy - exercises to include closed kinetic chain VMO strengthening, hip abductor/adductor/external rotator/internal rotator strengthening, IT band, quadriceps, and hamstring stretching, and core strengthening with use of modalities/taping as appropriate with home exercise prescription.  2. Activity modification as discussed, including limitation of activities that cause pain/discomfort.  3. Acetaminophen/Ibuprofen/ice as needed for improved pain control.  4. Follow-up in 6 weeks for further evaluation/medical care.  If asymptomatic, can follow-up as needed.  Consider referral to orthopedic surgery for consideration of a right knee arthroscopy, a right knee brace, etc. as deemed appropriate moving forward. Instructed to follow-up if change of symptoms arise.    Patient's conditions were thoroughly discussed during today's visit with greater than 50% of the visit spent counseling the patient with total time spent face-to-face with the patient being 15 minutes.    Kolton Berman DO, HAYDEN  [facility]    Disclaimer: This note consists of symbols derived from keyboarding, dictation and/or voice recognition software. As a result, there may be errors in the script that have gone undetected. Please consider this when interpreting information found in this chart.        Again, thank you for allowing me to participate in the care of your patient.        Sincerely,        Kolton Berman DO

## 2017-11-27 NOTE — PATIENT INSTRUCTIONS
We addressed the following today:    1. Right medial knee pain    Activity modification as discussed  Physical therapy: [facility] - [phone]  Topical Treatments: Ice  Over the counter medication: Acetaminophen (Tylenol) 1000 mg every 6 hours with food (Maximum of 3000 mg/day)  Ibuprofen (Advil) maximum of 800 mg four times a day with food  Follow up in 6 weeks if no improvement of symptoms for further evaluation/medical care (sooner if needed; call direct clinic number [[phone]] at any time with questions or concerns)

## 2018-06-18 ENCOUNTER — OFFICE VISIT (OUTPATIENT)
Dept: ORTHOPEDICS | Facility: CLINIC | Age: 28
End: 2018-06-18
Payer: COMMERCIAL

## 2018-06-18 VITALS
DIASTOLIC BLOOD PRESSURE: 75 MMHG | WEIGHT: 240 LBS | SYSTOLIC BLOOD PRESSURE: 131 MMHG | BODY MASS INDEX: 28.34 KG/M2 | HEIGHT: 77 IN

## 2018-06-18 DIAGNOSIS — S83.8X1A MENISCAL INJURY, RIGHT, INITIAL ENCOUNTER: Primary | ICD-10-CM

## 2018-06-18 PROCEDURE — 99214 OFFICE O/P EST MOD 30 MIN: CPT | Performed by: FAMILY MEDICINE

## 2018-06-18 NOTE — LETTER
6/18/2018         RE: Silas De Santiago  [address] [address] [facility] [address]        Dear Colleague,    Thank you for referring your patient, Silas De Santiago, to the [facility]. Please see a copy of my visit note below.    HPI   [facility]   Clinic Visit s Jun 18, 2018    PCP: Miki Alaniz Sj Rosen is a 28 year old male who is seen in consultation at the request of Dr. Alaniz for   Chief Complaint   Patient presents with     RECHECK       Injury: Patient describes injury as running a half marathon 1.5 months ago. 2 days ago ran [employer] as well.     No running for 1.5 months prior to Grandmas, feeling nearly normal, improved.         Location of Pain: right knee medial, nonradiating   Duration of Pain: 1.5 month(s)  Rating of Pain at worst: 6/10  Rating of Pain Currently: 3/10  Pain is better with: activity avoidance and rest   Pain is worse with: lateral movement, deep bending, squatting and stair climbing   Treatment so far consists of: brace and Pain medication: ibuprofen (Advil, Motrin)  Associated symptoms: no distal numbness or tingling; denies swelling or warmth  Recent imaging completed: Xrays and MRI 11/6/17  Prior History of related problems: has had knee pain in the past, treated by Dr. Berman    Current pain is different, feels unstable.     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     Past Medical History:   Diagnosis Date     NO ACTIVE PROBLEMS          Family History   Problem Relation Age of Onset     Asthma Father      Childhood Only     Asthma Paternal Grandmother      Asthma Brother      Childhood Only       Social History     Social History     Marital status:      Spouse name: N/A     Number of children: N/A     Years of education: N/A     Social History Main Topics     Smoking status: Never Smoker     Smokeless tobacco: Never Used     Alcohol use Yes     Past Surgical History:  "  Procedure Laterality Date     HC SHOULDER ARTHROSCOPY, DX Right     partial labral tear, debridement and arthroscopy (right)       Review of Systems   Musculoskeletal: Positive for joint pain.   All other systems reviewed and are negative.        Physical Exam   Musculoskeletal:        Right knee: Medial joint line tenderness noted.     /75  Ht 6' 5\" (1.956 m)  Wt 240 lb (108.9 kg)  BMI 28.46 kg/m2  Constitutional:well-developed, well-nourished, and in no distress.   Cardiovascular: Intact distal pulses.    Neurological: alert. Gait Normal:   Gait, station, stance, and balance appear normal for age  Skin: Skin is warm and dry.   Psychiatric: Mood and affect normal.   Respiratory: unlabored, speaks in full sentences  Lymph: no LAD, no lymphangitis          Left Knee Exam   Swelling: None  Effusion: No    Tenderness   None    Range of Motion   Extension: Normal  Flexion:     Normal    Tests   McMurrays:  Medial - Negative      Lateral - Negative  Lachman:  Anterior - Negative    Posterior - Negative  Drawer:       Anterior - Negative     Posterior - Negative  Varus:  Negative  Valgus: Negative  Pivot Shift: Negative  Patellar Apprehension: No    Right Knee Exam   Swelling: Mild  Effusion: Yes    Tenderness   The patient is experiencing tenderness in the medial joint line.    Range of Motion   Extension: Normal  Flexion:     Normal    Tests   McMurrays:  Medial - Positive      Lateral - Negative  Lachman:  Anterior - Negative    Posterior - Negative  Drawer:       Anterior - Negative    Posterior - Negative  Varus:  Negative  Valgus: Negative  Pivot Shift: Negative  Patellar Apprehension: No           EXAM: MRI EXAMINATION OF THE RIGHT KNEE    CLINICAL INFORMATION: Chronic right knee pain. No specific injury. No history of surgery to this area. Anteromedial knee pain. Evaluate plantar, medial meniscus tear, patellar chondromalacia.    TECHNICAL INFORMATION: Multiple proton density images were obtained in the " axial, sagittal and coronal planes, followed by proton density fat suppression images in the axial and sagittal planes. Coronal T2 fat suppression images were also obtained. There are no prior studies available for comparison.    INTERPRETATION:    Bones: No appreciable subchondral edema signal or cystic change. No evidence for occult fracture or osseous contusion. No other abnormal bone marrow edema pattern is identified.    Ligaments and tendons: The medial collateral ligament is intact, without acute sprain or tear. The iliotibial band, fibular collateral ligament, biceps femoris tendon and popliteus tendon all are intact. The anterior cruciate ligament is intact without acute sprain or tear. The posterior cruciate ligament is intact.    Extensor Mechanism: The patellar and quadriceps tendons are intact. The medial and lateral retinacula are intact.    Knee Joint: There is no knee joint effusion. No discrete popliteal cyst. No abnormal soft tissue fluid/edema signal to indicate MRI changes for bursitis about the knee. There is no evidence for a loose body.    Medial Compartment: There is a mild appearance of horizontal intrasubstance signal within the posterior horn medial meniscus. There is no evidence for a medial meniscus tear, though. No displaced flap fragment or parameniscal cyst. There is no focal chondral defect. No other significant changes of chondromalacia.    Lateral Compartment: There is no evidence for discrete lateral meniscal tear. No displaced flap fragment or parameniscal cyst. There is no focal chondral defect. No other significant changes of chondromalacia.    Patellofemoral articulation: There is no focal chondral defect. No other significant chondromalacia. Series 3 images 8 through 12 demonstrate a medial patella plica, which appears slightly irregular and may be minimally thickened.    CONCLUSION:    1. Mild intrasubstance signal within the posterior horn medial meniscus, but without  evidence for a medial meniscus tear. This may be related to mild intrasubstance mucoid degeneration.    2. A medial patella plica is present. This appears slightly irregular and may be minimally thickened as well.    3. Articular cartilage of the patellofemoral joint and elsewhere of the knee is normally preserved.    4. No abnormal knee joint effusion. No evidence for bursitis about the knee.    5. No other evidence for internal derangement.    KES  Electronically signed on 11/16/2017 8:34:00 AM by Neymar Waters M.D.     Right knee x-rays (3 views, Parson/sunrise/lateral)   Impression:   1. Fabella noted of the posterior portion of the right knee.  2. Enthesophyte noted of the superior portion of the right patella.  3. Possible narrowing of the right patellofemoral joint.  4. Negative for fracture, subluxation, or other acute osseous abnormalities.      ASSESSMENT/PLAN    ICD-10-CM    1. Meniscal injury, right, initial encounter S83.8X1A MR Knee Right w/o Contrast       Acute worsening of medial right knee pain indicates probable progression of medial meniscus tear, which was degenerative in nature on November MRI but has progressed.  Will notify via [employer], possible surgical referral if meniscus torn to consider repair    Again, thank you for allowing me to participate in the care of your patient.        Sincerely,        Garland Lipscomb MD

## 2018-06-18 NOTE — PROGRESS NOTES
[facility]   Clinic Visit s Jun 18, 2018    PCP: Miki Alaniz Sj Rosen is a 28 year old male who is seen in consultation at the request of Dr. Alaniz for   Chief Complaint   Patient presents with     RECHECK       Injury: Patient describes injury as running a half marathon 1.5 months ago. 2 days ago ran [employer] marathon as well.     No running for 1.5 months prior to Grandmas, feeling nearly normal, improved.         Location of Pain: right knee medial, nonradiating   Duration of Pain: 1.5 month(s)  Rating of Pain at worst: 6/10  Rating of Pain Currently: 3/10  Pain is better with: activity avoidance and rest   Pain is worse with: lateral movement, deep bending, squatting and stair climbing   Treatment so far consists of: brace and Pain medication: ibuprofen (Advil, Motrin)  Associated symptoms: no distal numbness or tingling; denies swelling or warmth  Recent imaging completed: Xrays and MRI 11/6/17  Prior History of related problems: has had knee pain in the past, treated by Dr. Berman    Current pain is different, feels unstable.     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     Past Medical History:   Diagnosis Date     NO ACTIVE PROBLEMS          Family History   Problem Relation Age of Onset     Asthma Father      Childhood Only     Asthma Paternal Grandmother      Asthma Brother      Childhood Only       Social History     Social History     Marital status:      Spouse name: N/A     Number of children: N/A     Years of education: N/A     Social History Main Topics     Smoking status: Never Smoker     Smokeless tobacco: Never Used     Alcohol use Yes     Past Surgical History:   Procedure Laterality Date     HC SHOULDER ARTHROSCOPY, DX Right     partial labral tear, debridement and arthroscopy (right)       Review of Systems   Musculoskeletal: Positive for joint pain.   All other systems reviewed and are  "negative.        Physical Exam   Musculoskeletal:        Right knee: Medial joint line tenderness noted.     /75  Ht 6' 5\" (1.956 m)  Wt 240 lb (108.9 kg)  BMI 28.46 kg/m2  Constitutional:well-developed, well-nourished, and in no distress.   Cardiovascular: Intact distal pulses.    Neurological: alert. Gait Normal:   Gait, station, stance, and balance appear normal for age  Skin: Skin is warm and dry.   Psychiatric: Mood and affect normal.   Respiratory: unlabored, speaks in full sentences  Lymph: no LAD, no lymphangitis          Left Knee Exam   Swelling: None  Effusion: No    Tenderness   None    Range of Motion   Extension: Normal  Flexion:     Normal    Tests   McMurrays:  Medial - Negative      Lateral - Negative  Lachman:  Anterior - Negative    Posterior - Negative  Drawer:       Anterior - Negative     Posterior - Negative  Varus:  Negative  Valgus: Negative  Pivot Shift: Negative  Patellar Apprehension: No    Right Knee Exam   Swelling: Mild  Effusion: Yes    Tenderness   The patient is experiencing tenderness in the medial joint line.    Range of Motion   Extension: Normal  Flexion:     Normal    Tests   McMurrays:  Medial - Positive      Lateral - Negative  Lachman:  Anterior - Negative    Posterior - Negative  Drawer:       Anterior - Negative    Posterior - Negative  Varus:  Negative  Valgus: Negative  Pivot Shift: Negative  Patellar Apprehension: No           EXAM: MRI EXAMINATION OF THE RIGHT KNEE    CLINICAL INFORMATION: Chronic right knee pain. No specific injury. No history of surgery to this area. Anteromedial knee pain. Evaluate plantar, medial meniscus tear, patellar chondromalacia.    TECHNICAL INFORMATION: Multiple proton density images were obtained in the axial, sagittal and coronal planes, followed by proton density fat suppression images in the axial and sagittal planes. Coronal T2 fat suppression images were also obtained. There are no prior studies available for " comparison.    INTERPRETATION:    Bones: No appreciable subchondral edema signal or cystic change. No evidence for occult fracture or osseous contusion. No other abnormal bone marrow edema pattern is identified.    Ligaments and tendons: The medial collateral ligament is intact, without acute sprain or tear. The iliotibial band, fibular collateral ligament, biceps femoris tendon and popliteus tendon all are intact. The anterior cruciate ligament is intact without acute sprain or tear. The posterior cruciate ligament is intact.    Extensor Mechanism: The patellar and quadriceps tendons are intact. The medial and lateral retinacula are intact.    Knee Joint: There is no knee joint effusion. No discrete popliteal cyst. No abnormal soft tissue fluid/edema signal to indicate MRI changes for bursitis about the knee. There is no evidence for a loose body.    Medial Compartment: There is a mild appearance of horizontal intrasubstance signal within the posterior horn medial meniscus. There is no evidence for a medial meniscus tear, though. No displaced flap fragment or parameniscal cyst. There is no focal chondral defect. No other significant changes of chondromalacia.    Lateral Compartment: There is no evidence for discrete lateral meniscal tear. No displaced flap fragment or parameniscal cyst. There is no focal chondral defect. No other significant changes of chondromalacia.    Patellofemoral articulation: There is no focal chondral defect. No other significant chondromalacia. Series 3 images 8 through 12 demonstrate a medial patella plica, which appears slightly irregular and may be minimally thickened.    CONCLUSION:    1. Mild intrasubstance signal within the posterior horn medial meniscus, but without evidence for a medial meniscus tear. This may be related to mild intrasubstance mucoid degeneration.    2. A medial patella plica is present. This appears slightly irregular and may be minimally thickened as well.    3.  Articular cartilage of the patellofemoral joint and elsewhere of the knee is normally preserved.    4. No abnormal knee joint effusion. No evidence for bursitis about the knee.    5. No other evidence for internal derangement.    KES  Electronically signed on 11/16/2017 8:34:00 AM by Neymar Waters M.D.     Right knee x-rays (3 views, Parson/sunrise/lateral)   Impression:   1. Fabella noted of the posterior portion of the right knee.  2. Enthesophyte noted of the superior portion of the right patella.  3. Possible narrowing of the right patellofemoral joint.  4. Negative for fracture, subluxation, or other acute osseous abnormalities.      ASSESSMENT/PLAN    ICD-10-CM    1. Meniscal injury, right, initial encounter S83.8X1A MR Knee Right w/o Contrast       Acute worsening of medial right knee pain indicates probable progression of medial meniscus tear, which was degenerative in nature on November MRI but has progressed.  Will notify via [employer], possible surgical referral if meniscus torn to consider repair

## 2018-06-18 NOTE — MR AVS SNAPSHOT
"              After Visit Summary   6/18/2018    Silas De Santiago    MRN: [id number]           Patient Information     Date Of Birth          1990        Visit Information        Provider Department      6/18/2018 5:20 PM Garland Lipscomb MD [facility]        Today's Diagnoses     Meniscal injury, right, initial encounter    -  1       Follow-ups after your visit        Who to contact     If you have questions or need follow up information about today's clinic visit or your schedule please contact [facility] directly at [phone].  Normal or non-critical lab and imaging results will be communicated to you by [employer], letter or phone within 4 business days after the clinic has received the results. If you do not hear from us within 7 days, please contact the clinic through [employer] or phone. If you have a critical or abnormal lab result, we will notify you by phone as soon as possible.  Submit refill requests through [employer] or call your pharmacy and they will forward the refill request to us. Please allow 3 business days for your refill to be completed.          Additional Information About Your Visit        MyChart Information     [employer] gives you secure access to your electronic health record. If you see a primary care provider, you can also send messages to your care team and make appointments. If you have questions, please call your primary care clinic.  If you do not have a primary care provider, please call [phone] and they will assist you.        Care EveryWhere ID     This is your Care EveryWhere ID. This could be used by other organizations to access your [place] medical records  [id number]        Your Vitals Were     Height BMI (Body Mass Index)                6' 5\" (1.956 m) 28.46 kg/m2           Blood Pressure from Last 3 Encounters:   06/18/18 131/75   10/27/17 122/68   06/23/17 116/76    Weight from Last 3 Encounters:   06/18/18 240 lb " (108.9 kg)   11/27/17 240 lb (108.9 kg)   11/06/17 240 lb (108.9 kg)               Primary Care Provider Office Phone # Fax #    Miki Alaniz PA-C [phone]       [address] [address] [facility] [address]        Equal Access to Services     GRACE KWASI : Hadii aad ku hadasho Soomaali, waaxda luqadaha, qaybta kaalmada adeegyada, waxay idiin hayaan adeeg kharash la'aan ah. So [facility] [phone].    ATENCIÓN: Si habla español, tiene a chacon disposición servicios gratuitos de asistencia lingüística. Llame al [phone].    We comply with applicable federal civil rights laws and [place] laws. We do not discriminate on the basis of race, color, national origin, age, disability, sex, sexual orientation, or gender identity.            Thank you!     Thank you for choosing [facility]  for your care. Our goal is always to provide you with excellent care. Hearing back from our patients is one way we can continue to improve our services. Please take a few minutes to complete the written survey that you may receive in the mail after your visit with us. Thank you!             Your Updated Medication List - Protect others around you: Learn how to safely use, store and throw away your medicines at www.disposemymeds.org.          This list is accurate as of 6/18/18 11:59 PM.  Always use your most recent med list.                   Brand Name Dispense Instructions for use Diagnosis    naproxen 500 MG tablet    NAPROSYN    30 tablet    Take 1 tablet (500 mg) by mouth 2 times daily as needed for moderate pain    Medial knee pain, right

## 2018-06-20 ENCOUNTER — TRANSFERRED RECORDS (OUTPATIENT)
Dept: HEALTH INFORMATION MANAGEMENT | Facility: CLINIC | Age: 28
End: 2018-06-20

## 2018-06-26 ENCOUNTER — TRANSFERRED RECORDS (OUTPATIENT)
Dept: HEALTH INFORMATION MANAGEMENT | Facility: CLINIC | Age: 28
End: 2018-06-26

## 2019-09-05 ENCOUNTER — TRANSFERRED RECORDS (OUTPATIENT)
Dept: HEALTH INFORMATION MANAGEMENT | Facility: CLINIC | Age: 29
End: 2019-09-05

## 2019-10-03 NOTE — PROGRESS NOTES
"SUBJECTIVE:   CC: Silas De Santiago is an 29 year old male who presents for preventative health visit.     Healthy Habits:     Getting at least 3 servings of Calcium per day:  Yes    Bi-annual eye exam:  Yes    Dental care twice a year:  Yes    Sleep apnea or symptoms of sleep apnea:  None    Diet:  Regular (no restrictions)    Frequency of exercise:  4-5 days/week    Duration of exercise:  30-45 minutes    Taking medications regularly:  Yes    Medication side effects:  Not applicable    PHQ-2 Total Score: 0    Additional concerns today:  Yes        {additional problems to add (Optional):131564}    Today's PHQ-2 Score:   PHQ-2 ( 1999 [employer]) 6/20/2017   Q1: Little interest or pleasure in doing things 0   Q2: Feeling down, depressed or hopeless 0   PHQ-2 Score 0   Q1: Little interest or pleasure in doing things Not at all   Q2: Feeling down, depressed or hopeless Not at all   PHQ-2 Score 0       Abuse: Current or Past(Physical, Sexual or Emotional)- No  Do you feel safe in your environment? Yes    Social History     Tobacco Use     Smoking status: Never Smoker     Smokeless tobacco: Never Used   Substance Use Topics     Alcohol use: Yes     {Rooming Staff- Complete this question if Prescreen response is not shown below for today's visit. If you drink alcohol do you typically have >3 drinks per day or >7 drinks per week? (Optional):569359}    Alcohol Use 6/20/2017   Prescreen: >3 drinks/day or >7 drinks/week? The patient does not drink >3 drinks per day nor >7 drinks per week.   {add AUDIT responses (Optional) (A score of 7 for adult men is an indication of hazardous drinking; a score of 8 or more is an indication of an alcohol use disorder.  A score of 7 or more for adult women is an indication of hazardous drinking or an alchohol use disorder):934908}    Last PSA: No results found for: PSA    Reviewed orders with patient. Reviewed health maintenance and updated orders accordingly - { :403531::\"Yes\"}  {Chronicprobdata " "(optional):451482}    Reviewed and updated as needed this visit by clinical staff         Reviewed and updated as needed this visit by Provider        {HISTORY OPTIONS (Optional):531013}    Review of Systems   Constitutional: Negative for chills and fever.   HENT: Negative for congestion, ear pain, hearing loss and sore throat.    Eyes: Negative for pain and visual disturbance.   Respiratory: Negative for cough and shortness of breath.    Cardiovascular: Negative for chest pain, palpitations and peripheral edema.   Gastrointestinal: Negative for abdominal pain, constipation, diarrhea, heartburn, hematochezia and nausea.   Genitourinary: Negative for discharge, dysuria, frequency, genital sores, hematuria, impotence and urgency.   Musculoskeletal: Positive for arthralgias. Negative for joint swelling and myalgias.   Skin: Negative for rash.   Neurological: Negative for dizziness, weakness, headaches and paresthesias.   Psychiatric/Behavioral: Negative for mood changes. The patient is not nervous/anxious.        OBJECTIVE:   There were no vitals taken for this visit.    Physical Exam  {Exam Choices (Optional):846002}    {Diagnostic Test Results (Optional):819370::\"Diagnostic Test Results:\",\"Labs reviewed in Epic\"}    ASSESSMENT/PLAN:   {Diag Picklist:321133}    COUNSELING:   {MALE COUNSELING MESSAGES:579962::\"Reviewed preventive health counseling, as reflected in patient instructions\"}    Estimated body mass index is 28.46 kg/m  as calculated from the following:    Height as of 6/18/18: 1.956 m (6' 5\").    Weight as of 6/18/18: 108.9 kg (240 lb).     {Weight Management Plan (ACO) Complete if BMI is abnormal-  Ages 18-64  BMI >24.9.  Age 65+ with BMI <23 or >30 (Optional):492952}     reports that he has never smoked. He has never used smokeless tobacco.  {Tobacco Cessation -- Complete if patient is a smoker (Optional):933579}    Counseling Resources:  ATP IV Guidelines  Pooled Cohorts Equation Calculator  FRAX Risk " Assessment  ICSI Preventive Guidelines  Dietary Guidelines for Americans, 2010  USDA's MyPlate  ASA Prophylaxis  Lung CA Screening    Miki Alaniz PA-C  [facility]

## 2019-10-04 ENCOUNTER — OFFICE VISIT (OUTPATIENT)
Dept: FAMILY MEDICINE | Facility: CLINIC | Age: 29
End: 2019-10-04
Payer: COMMERCIAL

## 2019-10-04 VITALS
BODY MASS INDEX: 28.44 KG/M2 | RESPIRATION RATE: 18 BRPM | SYSTOLIC BLOOD PRESSURE: 120 MMHG | DIASTOLIC BLOOD PRESSURE: 70 MMHG | HEART RATE: 57 BPM | TEMPERATURE: 97 F | HEIGHT: 77 IN | WEIGHT: 240.9 LBS | OXYGEN SATURATION: 97 %

## 2019-10-04 DIAGNOSIS — S83.8X1A MENISCAL INJURY, RIGHT, INITIAL ENCOUNTER: ICD-10-CM

## 2019-10-04 DIAGNOSIS — Z01.818 PREOP GENERAL PHYSICAL EXAM: Primary | ICD-10-CM

## 2019-10-04 LAB — HGB BLD-MCNC: 14.6 G/DL (ref 13.3–17.7)

## 2019-10-04 PROCEDURE — 99214 OFFICE O/P EST MOD 30 MIN: CPT | Performed by: PHYSICIAN ASSISTANT

## 2019-10-04 PROCEDURE — 36415 COLL VENOUS BLD VENIPUNCTURE: CPT | Performed by: PHYSICIAN ASSISTANT

## 2019-10-04 PROCEDURE — 85018 HEMOGLOBIN: CPT | Performed by: PHYSICIAN ASSISTANT

## 2019-10-04 ASSESSMENT — ENCOUNTER SYMPTOMS
FREQUENCY: 0
MYALGIAS: 0
NAUSEA: 0
JOINT SWELLING: 0
CONSTIPATION: 0
ARTHRALGIAS: 1
HEMATOCHEZIA: 0
DYSURIA: 0
FEVER: 0
NERVOUS/ANXIOUS: 0
WEAKNESS: 0
EYE PAIN: 0
DIZZINESS: 0
HEMATURIA: 0
SORE THROAT: 0
SHORTNESS OF BREATH: 0
ABDOMINAL PAIN: 0
COUGH: 0
HEADACHES: 0
CHILLS: 0
HEARTBURN: 0
DIARRHEA: 0
PALPITATIONS: 0
PARESTHESIAS: 0

## 2019-10-04 ASSESSMENT — MIFFLIN-ST. JEOR: SCORE: 2175.1

## 2019-10-04 NOTE — PROGRESS NOTES
[facility]  [address] [address] [phone]  Dept: [phone]    PRE-OP EVALUATION:  Today's date: 10/4/2019    Silas De Santiago (: 1990) presents for pre-operative evaluation assessment as requested by Dr. Casimiro Noel. He requires evaluation and anesthesia risk assessment prior to undergoing surgery/procedure for treatment of Right knee meniscectomy/arthroscopy.    Proposed Surgery/ Procedure: Right Knee Arthroscopy/meniscetomy    Date of Surgery/ Procedure: 10/8/19  Time of Surgery/ Procedure: 2:00pm  Hospital/Surgical Facility: [facility]   Fax number for surgical facility: [phone]  Primary Physician: Miki Alaniz  Type of Anesthesia Anticipated: General    Patient has a Health Care Directive or Living Will:  NO    1. NO - Do you have a history of heart attack, stroke, stent, bypass or surgery on an artery in the head, neck, heart or legs?  2. NO - Do you ever have any pain or discomfort in your chest?  3. NO - Do you have a history of  Heart Failure?  4. NO - Are you troubled by shortness of breath when: walking on the level, up a slight hill or at night?  5. NO - Do you currently have a cold, bronchitis or other respiratory infection?  6. NO - Do you have a cough, shortness of breath or wheezing?  7. NO - Do you sometimes get pains in the calves of your legs when you walk?  8. NO - Do you or anyone in your family have previous history of blood clots?  9. NO - Do you or does anyone in your family have a serious bleeding problem such as prolonged bleeding following surgeries or cuts?  10. NO - Have you ever had problems with anemia or been told to take iron pills?  11. NO - Have you had any abnormal blood loss such as black, tarry or bloody stools, or abnormal vaginal bleeding?  12. NO - Have you ever had a blood transfusion?  13. NO - Have you or any of your relatives ever had problems with anesthesia?  14. NO - Do you have sleep apnea,  "excessive snoring or daytime drowsiness?  15. NO - Do you have any prosthetic heart valves?  16. NO - Do you have prosthetic joints?  17. NO - Is there any chance that you may be pregnant?      HPI:     HPI related to upcoming procedure: patient with a chronic hx of right knee pain; MRI shows oblique tear of medial meniscus        See problem list for active medical problems.  Problems all longstanding and stable, except as noted/documented.  See ROS for pertinent symptoms related to these conditions.      MEDICAL HISTORY:     Patient Active Problem List    Diagnosis Date Noted     Meniscal injury, right, initial encounter 06/18/2018     Priority: Medium      Past Medical History:   Diagnosis Date     NO ACTIVE PROBLEMS      Past Surgical History:   Procedure Laterality Date     HC SHOULDER ARTHROSCOPY, DX Right     partial labral tear, debridement and arthroscopy (right)     Current Outpatient Medications   Medication Sig Dispense Refill     UNKNOWN TO PATIENT Medication is taken for Jock Itch       naproxen (NAPROSYN) 500 MG tablet Take 1 tablet (500 mg) by mouth 2 times daily as needed for moderate pain (Patient not taking: Reported on 10/4/2019) 30 tablet 0     OTC products: None, except as noted above    No Known Allergies   Latex Allergy: NO    Social History     Tobacco Use     Smoking status: Never Smoker     Smokeless tobacco: Never Used   Substance Use Topics     Alcohol use: Yes     History   Drug Use No       REVIEW OF SYSTEMS:   Constitutional, neuro, ENT, endocrine, pulmonary, cardiac, gastrointestinal, genitourinary, musculoskeletal, integument and psychiatric systems are negative, except as otherwise noted.    EXAM:   /70   Pulse 57   Temp 97  F (36.1  C) (Tympanic)   Resp 18   Ht 1.956 m (6' 5\")   Wt 109.3 kg (240 lb 14.4 oz)   SpO2 97%   BMI 28.57 kg/m      GENERAL APPEARANCE: healthy, alert and no distress     EYES: EOMI,  PERRL     HENT: ear canals and TM's normal and nose and mouth " without ulcers or lesions     NECK: no adenopathy, no asymmetry, masses, or scars and thyroid normal to palpation     RESP: lungs clear to auscultation - no rales, rhonchi or wheezes     CV: regular rates and rhythm, normal S1 S2, no S3 or S4 and no murmur, click or rub     ABDOMEN:  soft, nontender, no HSM or masses and bowel sounds normal     MS: no overt warmth, rash or swelling of knee space. Pedal pulses intact      SKIN: no suspicious lesions or rashes     NEURO: Normal strength and tone, sensory exam grossly normal, mentation intact and speech normal     PSYCH: mentation appears normal. and affect normal/bright    DIAGNOSTICS:     Labs Resulted Today:   Results for orders placed or performed in visit on 10/04/19   Hemoglobin   Result Value Ref Range    Hemoglobin 14.6 13.3 - 17.7 g/dL       IMPRESSION:   Reason for surgery/procedure: right knee arthroscopy/mesisctomy   Diagnosis/reason for consult: Pre-operative exam    The proposed surgical procedure is considered INTERMEDIATE risk.    REVISED CARDIAC RISK INDEX  The patient has the following serious cardiovascular risks for perioperative complications such as (MI, PE, VFib and 3  AV Block):  No serious cardiac risks  INTERPRETATION: 0 risks: Class I (very low risk - 0.4% complication rate)    The patient has the following additional risks for perioperative complications:  No identified additional risks      ICD-10-CM    1. Preop general physical exam Z01.818 Hemoglobin   2. Meniscal injury, right, initial encounter S83.8X1A        RECOMMENDATIONS:     APPROVAL GIVEN to proceed with proposed procedure, without further diagnostic evaluation       Signed Electronically by: Miki Alaniz PA-C    Copy of this evaluation report is provided to requesting physician.    [facility] Preop Guidelines    Revised Cardiac Risk Index

## 2019-10-22 ENCOUNTER — TRANSFERRED RECORDS (OUTPATIENT)
Dept: HEALTH INFORMATION MANAGEMENT | Facility: CLINIC | Age: 29
End: 2019-10-22

## 2020-03-10 ENCOUNTER — HEALTH MAINTENANCE LETTER (OUTPATIENT)
Age: 30
End: 2020-03-10

## 2020-04-14 ENCOUNTER — MYC MEDICAL ADVICE (OUTPATIENT)
Dept: FAMILY MEDICINE | Facility: CLINIC | Age: 30
End: 2020-04-14

## 2020-04-15 ENCOUNTER — MYC MEDICAL ADVICE (OUTPATIENT)
Dept: FAMILY MEDICINE | Facility: CLINIC | Age: 30
End: 2020-04-15

## 2020-04-15 ENCOUNTER — VIRTUAL VISIT (OUTPATIENT)
Dept: FAMILY MEDICINE | Facility: CLINIC | Age: 30
End: 2020-04-15
Payer: COMMERCIAL

## 2020-04-15 DIAGNOSIS — M25.561 CHRONIC PAIN OF RIGHT KNEE: Primary | ICD-10-CM

## 2020-04-15 DIAGNOSIS — G89.29 CHRONIC PAIN OF RIGHT KNEE: Primary | ICD-10-CM

## 2020-04-15 PROCEDURE — 99213 OFFICE O/P EST LOW 20 MIN: CPT | Mod: GT | Performed by: PHYSICIAN ASSISTANT

## 2020-04-15 NOTE — LETTER
[facility]  [address] [facility] [address] [facility] [address]  Phone: [phone]    April 15, 2020        Silas De Santiago  [address] [place] [address]          To whom it may concern:    RE: Silas De Santiago    This patient is seen and treated regularly at our clinic since summer of 2017.  At that time we discussed his chronic right knee pain. In review of our records, he verbalized the symptoms first occurred during a tour [place]. From my understanding through discussions with Silas, he did not have knee pain or injury prior to this. 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 This occurred both prior to, during and after his knee pain onset. While he cannot recall a specific event that caused his chronic pain, this general wear and tear continued to worsen his symptoms. 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 Early for someone his age. I understand he had an ultrasound at one point through the service, but I do not have those records. His pain continued despite him trying to push on and eventually the degenerative changes became a full tear. He needed arthroscopic repair in Fall of 2019. In review of his provided history and injury course, I believe that Mr. De Santiago's condition was at least as likely as not related to his service.     Please contact me for questions or concerns.      Sincerely,        Miki Alaniz PA-C

## 2020-04-15 NOTE — LETTER
[facility]  [address] [facility] [address]  Phone: [phone]    April 23, 2020        Silas De Santiago  [address] [address]          To whom it may concern:    RE: Silas De Santiago    This patient is seen and treated regularly at our clinic since summer of 2017.  At that time we discussed his chronic right knee pain. In review of our records, he verbalized the symptoms first occurred during a [place]. From my understanding through discussions with Silas, he did not have knee pain or injury prior to this. 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 This occurred both prior to, during and after his knee pain onset. While he cannot recall a specific event that caused his chronic pain, this general wear and tear continued to worsen his symptoms. 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 Early for someone his age. I understand he had an ultrasound at one point through the service, but I do not have those records. His pain continued despite him trying to push on and eventually the degenerative changes became a full tear. He needed arthroscopic repair in Fall of 2019. In review of his provided history and injury course, I believe that Mr. De Santiago's condition was at least as likely as not related to his service.     Please contact me for questions or concerns.      Sincerely,        Miki Alanzi PA-C

## 2020-04-15 NOTE — PROGRESS NOTES
"Silas De Santiago is a 29 year old male who is being evaluated via a billable video visit.      The patient has been notified of following:     \"This video visit will be conducted via a call between you and your physician/provider. We have found that certain health care needs can be provided without the need for an in-person physical exam.  This service lets us provide the care you need with a video conversation.  If a prescription is necessary we can send it directly to your pharmacy.  If lab work is needed we can place an order for that and you can then stop by our lab to have the test done at a later time.    Video visits are billed at different rates depending on your insurance coverage.  Please reach out to your insurance provider with any questions.    If during the course of the call the physician/provider feels a video visit is not appropriate, you will not be charged for this service.\"    Patient has given verbal consent for Video visit? Yes    How would you like to obtain your AVS? 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  Pain was intermittent following, and chronic  He has seen multiple ortho providers and ultimately had a medial meniscectomy " "in Fall of 2019         Video Start Time: 2:44pm    Patient Active Problem List   Diagnosis     Meniscal injury, right, initial encounter     Past Surgical History:   Procedure Laterality Date     HC SHOULDER ARTHROSCOPY, DX Right     partial labral tear, debridement and arthroscopy (right)       Social History     Tobacco Use     Smoking status: Never Smoker     Smokeless tobacco: Never Used   Substance Use Topics     Alcohol use: Yes     Family History   Problem Relation Age of Onset     Asthma Father         Childhood Only     Asthma Paternal Grandmother      Asthma Brother         Childhood Only           Reviewed and updated as needed this visit by Provider         Review of Systems   ROS COMP: Constitutional, HEENT, cardiovascular, pulmonary, gi and gu systems are negative, except as otherwise noted.      Objective    There were no vitals taken for this visit.  Estimated body mass index is 28.57 kg/m  as calculated from the following:    Height as of 10/4/19: 1.956 m (6' 5\").    Weight as of 10/4/19: 109.3 kg (240 lb 14.4 oz).  Physical Exam   GENERAL: healthy, alert and no distress    Diagnostic Test Results:  Labs reviewed in Epic        Assessment & Plan     1. Chronic pain of right knee  I will attempt to formulate a note to the best of my ability, documenting the facts that I know. He is fully aware that I did not see him until long after the original injury and the info I can provide is purely anecdotal with the patient as the source as well as info following our meeting after that, of which I have more concrete information.        BMI:   Estimated body mass index is 28.57 kg/m  as calculated from the following:    Height as of 10/4/19: 1.956 m (6' 5\").    Weight as of 10/4/19: 109.3 kg (240 lb 14.4 oz).     No follow-ups on file.    Miki Alaniz PA-C  [facility]      Video-Visit Details    Type of service:  Video Visit    Video End Time (time video stopped): " 2:55pm    Originating Location (pt. Location): Home    Distant Location (provider location):  [facility]     Mode of Communication:  Video Conference via [employer]    No follow-ups on file.       Miki Alaniz PA-C

## 2020-11-16 NOTE — PROGRESS NOTES
SUBJECTIVE:   CC: Silas De Santiago is an 30 year old male who presents for preventative health visit.     Patient has been advised of split billing requirements and indicates understanding: Yes  Healthy Habits:     Getting at least 3 servings of Calcium per day:  Yes    Bi-annual eye exam:  Yes    Dental care twice a year:  Yes    Sleep apnea or symptoms of sleep apnea:  None    Diet:  Regular (no restrictions)    Frequency of exercise:  4-5 days/week    Duration of exercise:  30-45 minutes    Taking medications regularly:  Yes    Medication side effects:  Not applicable    PHQ-2 Total Score: 0    Additional concerns today:  Yes (Derm concern, Back pain/knee pain, lump on neck )    Silas De Santiago is a 30 year old male who presents today for annual physical  Some concerns today but overall feels well    1. Rash on both ankles around one month ago; bubbled up and then has some drainage; different sizes; were itchy; took a month to go away    2. Lump on neck; is has been present for awhile; not changing in size  Irritating during haircuts; never getting inflamed    3. Ongoing back pain/weakness; irritating after sitting immobile for a period; did get an xray at chiropractor; normal      Today's PHQ-2 Score:   PHQ-2 ( 1999 [employer]) 10/4/2019   Q1: Little interest or pleasure in doing things 0   Q2: Feeling down, depressed or hopeless 0   PHQ-2 Score 0   Q1: Little interest or pleasure in doing things -   Q2: Feeling down, depressed or hopeless -   PHQ-2 Score -       Abuse: Current or Past(Physical, Sexual or Emotional)- No  Do you feel safe in your environment? Yes    Social History     Tobacco Use     Smoking status: Never Smoker     Smokeless tobacco: Never Used   Substance Use Topics     Alcohol use: Yes       Alcohol Use 10/4/2019   Prescreen: >3 drinks/day or >7 drinks/week? No   Prescreen: >3 drinks/day or >7 drinks/week? -     Last PSA: No results found for: PSA    Reviewed orders with patient. Reviewed health  "maintenance and updated orders accordingly - Yes  Lab work is in process    Reviewed and updated as needed this visit by clinical staff                 Reviewed and updated as needed this visit by Provider                    Review of Systems   Constitutional: Negative for chills and fever.   HENT: Negative for congestion, ear pain, hearing loss and sore throat.    Eyes: Negative for pain and visual disturbance.   Respiratory: Negative for cough and shortness of breath.    Cardiovascular: Negative for chest pain, palpitations and peripheral edema.   Gastrointestinal: Negative for abdominal pain, constipation, diarrhea, heartburn, hematochezia and nausea.   Genitourinary: Negative for dysuria, frequency, genital sores, hematuria and urgency.   Musculoskeletal: Positive for arthralgias. Negative for joint swelling and myalgias.   Skin: Positive for rash.   Neurological: Negative for dizziness, weakness, headaches and paresthesias.   Psychiatric/Behavioral: Negative for mood changes. The patient is not nervous/anxious.        OBJECTIVE:   /62   Pulse 70   Temp 97  F (36.1  C) (Tympanic)   Resp 16   Ht 1.943 m (6' 4.5\")   Wt 107 kg (236 lb)   SpO2 96%   BMI 28.35 kg/m      Physical Exam  GENERAL: healthy, alert and no distress  EYES: Eyes grossly normal to inspection, PERRL and conjunctivae and sclerae normal  HENT: ear canals and TM's normal, nose and mouth without ulcers or lesions  NECK: no adenopathy, no asymmetry, masses, or scars and thyroid normal to palpation  RESP: lungs clear to auscultation - no rales, rhonchi or wheezes  CV: regular rate and rhythm, normal S1 S2, no S3 or S4, no murmur, click or rub, no peripheral edema and peripheral pulses strong  ABDOMEN: soft, nontender, no hepatosplenomegaly, no masses and bowel sounds normal   (male): not examined  MS: did not examine knees. Limited back exam, mostly through history. No masha abnormality  SKIN: there is a mobile soft mass to the central " "cervical space just distal to his hairline. There is no central punctum  NEURO: Normal strength and tone, mentation intact and speech normal  PSYCH: mentation appears normal, affect normal/bright    Diagnostic Test Results:  Labs reviewed in Epic  Updating in the future    ASSESSMENT/PLAN:   1. Routine general medical examination at a health care facility  Reviewed personal and family history. Reviewed age appropriate screenings. Recommended any needed vaccinations - he is up to date. Recommend some fasting labs over the next few weeks/months  - Basic metabolic panel; Future  - Lipid panel reflex to direct LDL Fasting; Future    2. Lipoma of neck  We have discussed this previously but it has now become more irritating to him. Do recommend removal in this case  - GENERAL SURG ADULT REFERRAL; Future    3. Midline low back pain without sciatica, unspecified chronicity  He has seen chiropractor recently and does note they did xrays but it doesn't sound like anything was noted. I suspect he would do very well with a course of physical therapy both for knowledge of help with acute pain and for prevention moving forward  - [facility], AND CHIROPRACTIC REFERRAL; Future    4. Skin lesion  What he is describing sounds like dishydrosis however it has resolved by the time he was here. He does not have any pictures of the rash. Did recommend he start with steroid early on next time and certainly could be seen as well      Patient has been advised of split billing requirements and indicates understanding: Yes  COUNSELING:   Reviewed preventive health counseling, as reflected in patient instructions    Estimated body mass index is 28.57 kg/m  as calculated from the following:    Height as of 10/4/19: 1.956 m (6' 5\").    Weight as of 10/4/19: 109.3 kg (240 lb 14.4 oz).     He reports that he has never smoked. He has never used smokeless tobacco.      Counseling Resources:  ATP IV Guidelines  Pooled Cohorts Equation " Calculator  FRAX Risk Assessment  ICSI Preventive Guidelines  Dietary Guidelines for Americans, 2010  USDA's MyPlate  ASA Prophylaxis  Lung CA Screening    MONICA Prasad [facility]

## 2020-11-19 ENCOUNTER — OFFICE VISIT (OUTPATIENT)
Dept: FAMILY MEDICINE | Facility: CLINIC | Age: 30
End: 2020-11-19
Payer: COMMERCIAL

## 2020-11-19 VITALS
HEART RATE: 70 BPM | RESPIRATION RATE: 16 BRPM | BODY MASS INDEX: 27.87 KG/M2 | WEIGHT: 236 LBS | TEMPERATURE: 97 F | DIASTOLIC BLOOD PRESSURE: 62 MMHG | OXYGEN SATURATION: 96 % | SYSTOLIC BLOOD PRESSURE: 120 MMHG | HEIGHT: 77 IN

## 2020-11-19 DIAGNOSIS — M54.50 MIDLINE LOW BACK PAIN WITHOUT SCIATICA, UNSPECIFIED CHRONICITY: ICD-10-CM

## 2020-11-19 DIAGNOSIS — Z00.00 ROUTINE GENERAL MEDICAL EXAMINATION AT A HEALTH CARE FACILITY: Primary | ICD-10-CM

## 2020-11-19 DIAGNOSIS — L98.9 SKIN LESION: ICD-10-CM

## 2020-11-19 DIAGNOSIS — D17.0 LIPOMA OF NECK: ICD-10-CM

## 2020-11-19 PROCEDURE — 99213 OFFICE O/P EST LOW 20 MIN: CPT | Mod: 25 | Performed by: PHYSICIAN ASSISTANT

## 2020-11-19 PROCEDURE — 99395 PREV VISIT EST AGE 18-39: CPT | Performed by: PHYSICIAN ASSISTANT

## 2020-11-19 ASSESSMENT — ENCOUNTER SYMPTOMS
EYE PAIN: 0
HEMATOCHEZIA: 0
DIARRHEA: 0
FEVER: 0
WEAKNESS: 0
SORE THROAT: 0
HEARTBURN: 0
NERVOUS/ANXIOUS: 0
FREQUENCY: 0
NAUSEA: 0
ABDOMINAL PAIN: 0
PALPITATIONS: 0
SHORTNESS OF BREATH: 0
ARTHRALGIAS: 1
HEADACHES: 0
MYALGIAS: 0
HEMATURIA: 0
DYSURIA: 0
PARESTHESIAS: 0
CHILLS: 0
CONSTIPATION: 0
JOINT SWELLING: 0
COUGH: 0
DIZZINESS: 0

## 2020-11-19 ASSESSMENT — MIFFLIN-ST. JEOR: SCORE: 2139.93

## 2020-12-14 ENCOUNTER — THERAPY VISIT (OUTPATIENT)
Dept: PHYSICAL THERAPY | Facility: CLINIC | Age: 30
End: 2020-12-14
Payer: COMMERCIAL

## 2020-12-14 DIAGNOSIS — G89.29 CHRONIC BILATERAL LOW BACK PAIN WITHOUT SCIATICA: ICD-10-CM

## 2020-12-14 DIAGNOSIS — M54.50 MIDLINE LOW BACK PAIN WITHOUT SCIATICA, UNSPECIFIED CHRONICITY: ICD-10-CM

## 2020-12-14 DIAGNOSIS — M54.50 CHRONIC BILATERAL LOW BACK PAIN WITHOUT SCIATICA: ICD-10-CM

## 2020-12-14 PROCEDURE — 97110 THERAPEUTIC EXERCISES: CPT | Mod: GP | Performed by: PHYSICAL THERAPIST

## 2020-12-14 PROCEDURE — 97161 PT EVAL LOW COMPLEX 20 MIN: CPT | Mod: GP | Performed by: PHYSICAL THERAPIST

## 2020-12-14 NOTE — PROGRESS NOTES
"[facility] Initial Evaluation  Subjective:  The history is provided by the patient. No  was used.   Patient Health History  Silas De Santiago being seen for LBP.     Date of Onset: 2+ years ago.   HPI: Documentation: unknown.   Pain is reported as 0/10 on pain scale.  General health as reported by patient is excellent.  Pertinent medical history includes: none.   Red flags:  None as reported by patient.  Medical allergies: none.   Surgeries include:  Orthopedic surgery. Other surgery history details: R shoulder and L knee.    Current medications:  None.    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   Primary job tasks include:  Computer work and prolonged sitting.                  Therapist Generated HPI Evaluation  Problem details: PT order 11/19/2020.  Pt. complains of central LBP that has been present for greater than 2 years.  No known trauma.  Sx's increase with prolonged sitting.      .         Type of problem:  Lumbar.        Where condition occurred: for unknown reasons.  Patient reports pain:  Central lumbar spine.  Pain is described as aching and is intermittent.  Pain radiates to:  No radiation. Pain is worse during the day.  Since onset symptoms are unchanged.  Exacerbated by: running and prolonged sitting.  and relieved by activity/movement.  Imaging testing: none.  Previous treatment includes chiropractic. There was none improvement following previous treatment.  Restrictions due to condition include:  Working in normal job without restrictions.  Barriers include:  None as reported by patient.                        Objective:  Standing Alignment:        Lumbar:  Normal  Pelvic:  Normal              Flexibility/Screens:   Positive screens:  LumbarNegative screens: Hip     Lower Extremity:  Decreased left lower extremity flexibility:Hamstrings    Decreased right lower extremity flexibility:  Hamstrings  Spine:  Decreased left spine flexibility:  Quadratus " Lumborum    Decreased right spine flexibility:  Quadratus Lumborum             Lumbar/SI Evaluation  ROM:  AROM Lumbar: normal  AROM Lumbar:   Flexion:          100% with end range stiffness  Ext:                    100% with end range stiffness   Side Bend:        Left:  100% with end range stiffness    Right:  100% with end range stiffness  Rotation:           Left:     Right:   Side Glide:        Left:     Right:                   Neural Tension/Mobility:  Lumbar:  Normal        Lumbar Palpation:  normal        Lumbar Provocation:  normal      Spinal Segmental Conclusions:     Level: Hypo noted at L5 and L4                                                   General     ROS    Assessment/Plan:    Patient is a 30 year old male with lumbar complaints.    Patient has the following significant findings with corresponding treatment plan.                Diagnosis 1:  LBP  Pain -  self management, education, directional preference exercise and home program  Decreased ROM/flexibility - manual therapy and therapeutic exercise  Decreased strength - therapeutic exercise and therapeutic activities  Decreased proprioception - neuro re-education and therapeutic activities  Impaired muscle performance - neuro re-education  Decreased function - therapeutic activities    Therapy Evaluation Codes:   1) Clinical presentation characteristics are:   Stable/Uncomplicated.  2) Decision-Making    Low complexity using standardized patient assessment instrument and/or measureable assessment of functional outcome.  Cumulative Therapy Evaluation is: Low complexity.    Previous and current functional limitations:  (See Goal Flow Sheet for this information)    Short term and Long term goals: (See Goal Flow Sheet for this information)     Communication ability:  Patient appears to be able to clearly communicate and understand verbal and written communication and follow directions correctly.  Treatment Explanation - The following has been  discussed with the patient:   RX ordered/plan of care  Anticipated outcomes  Possible risks and side effects  This patient would benefit from PT intervention to resume normal activities.   Rehab potential is good.    Frequency:  1 X week, once daily  Duration:  for 6 weeks  Discharge Plan:  Achieve all LTG.  Independent in home treatment program.  Reach maximal therapeutic benefit.    Please refer to the daily flowsheet for treatment today, total treatment time and time spent performing 1:1 timed codes.

## 2021-02-02 PROBLEM — G89.29 CHRONIC BILATERAL LOW BACK PAIN WITHOUT SCIATICA: Status: RESOLVED | Noted: 2020-12-14 | Resolved: 2021-02-02

## 2021-02-02 PROBLEM — M54.50 CHRONIC BILATERAL LOW BACK PAIN WITHOUT SCIATICA: Status: RESOLVED | Noted: 2020-12-14 | Resolved: 2021-02-02

## 2021-09-17 ENCOUNTER — OFFICE VISIT (OUTPATIENT)
Dept: FAMILY MEDICINE | Facility: CLINIC | Age: 31
End: 2021-09-17
Payer: COMMERCIAL

## 2021-09-17 VITALS
WEIGHT: 236.7 LBS | HEART RATE: 72 BPM | DIASTOLIC BLOOD PRESSURE: 74 MMHG | OXYGEN SATURATION: 97 % | HEIGHT: 77 IN | RESPIRATION RATE: 14 BRPM | BODY MASS INDEX: 27.95 KG/M2 | SYSTOLIC BLOOD PRESSURE: 100 MMHG | TEMPERATURE: 97.3 F

## 2021-09-17 DIAGNOSIS — Z11.59 NEED FOR HEPATITIS C SCREENING TEST: ICD-10-CM

## 2021-09-17 DIAGNOSIS — Z11.4 SCREENING FOR HIV (HUMAN IMMUNODEFICIENCY VIRUS): ICD-10-CM

## 2021-09-17 DIAGNOSIS — Z00.00 ROUTINE GENERAL MEDICAL EXAMINATION AT A HEALTH CARE FACILITY: Primary | ICD-10-CM

## 2021-09-17 LAB
HCV AB SERPL QL IA: NONREACTIVE
HIV 1+2 AB+HIV1 P24 AG SERPL QL IA: NONREACTIVE

## 2021-09-17 PROCEDURE — 36415 COLL VENOUS BLD VENIPUNCTURE: CPT | Performed by: PHYSICIAN ASSISTANT

## 2021-09-17 PROCEDURE — 86803 HEPATITIS C AB TEST: CPT | Performed by: PHYSICIAN ASSISTANT

## 2021-09-17 PROCEDURE — 80053 COMPREHEN METABOLIC PANEL: CPT | Performed by: PHYSICIAN ASSISTANT

## 2021-09-17 PROCEDURE — 87389 HIV-1 AG W/HIV-1&-2 AB AG IA: CPT | Performed by: PHYSICIAN ASSISTANT

## 2021-09-17 PROCEDURE — 99395 PREV VISIT EST AGE 18-39: CPT | Performed by: PHYSICIAN ASSISTANT

## 2021-09-17 PROCEDURE — 80061 LIPID PANEL: CPT | Performed by: PHYSICIAN ASSISTANT

## 2021-09-17 ASSESSMENT — ENCOUNTER SYMPTOMS
PARESTHESIAS: 0
WEAKNESS: 0
COUGH: 0
FREQUENCY: 0
DIZZINESS: 0
CONSTIPATION: 0
ABDOMINAL PAIN: 0
HEADACHES: 0
DYSURIA: 0
CHILLS: 0
SHORTNESS OF BREATH: 0
HEARTBURN: 0
PALPITATIONS: 0
FEVER: 0
HEMATOCHEZIA: 0
NAUSEA: 0
MYALGIAS: 0
HEMATURIA: 0
ARTHRALGIAS: 0
NERVOUS/ANXIOUS: 0
JOINT SWELLING: 0
SORE THROAT: 0
EYE PAIN: 0
DIARRHEA: 0

## 2021-09-17 ASSESSMENT — PAIN SCALES - GENERAL: PAINLEVEL: NO PAIN (0)

## 2021-09-17 ASSESSMENT — MIFFLIN-ST. JEOR: SCORE: 2150

## 2021-09-17 NOTE — PROGRESS NOTES
SUBJECTIVE:   CC: Silas De Santiago is an 31 year old male who presents for preventative health visit.       Patient has been advised of split billing requirements and indicates understanding: Yes  Healthy Habits:     Getting at least 3 servings of Calcium per day:  Yes    Bi-annual eye exam:  Yes    Dental care twice a year:  Yes    Sleep apnea or symptoms of sleep apnea:  None    Diet:  Regular (no restrictions)    Frequency of exercise:  4-5 days/week    Duration of exercise:  30-45 minutes    Taking medications regularly:  No    Medication side effects:  Not applicable    PHQ-2 Total Score: 0    Additional concerns today:  No    Silas De Santiago is a 31 year old male who presents today for annual check up  He remains fit and healthy    Today's PHQ-2 Score:   PHQ-2 ( 1999 Pfizer) 9/17/2021   Q1: Little interest or pleasure in doing things 0   Q2: Feeling down, depressed or hopeless 0   PHQ-2 Score 0   Q1: Little interest or pleasure in doing things Not at all   Q2: Feeling down, depressed or hopeless Not at all   PHQ-2 Score 0       Abuse: Current or Past(Physical, Sexual or Emotional)- No  Do you feel safe in your environment? Yes    Have you ever done Advance Care Planning? (For example, a Health Directive, POLST, or a discussion with a medical provider or your loved ones about your wishes): No, advance care planning information given to patient to review.  Patient declined advance care planning discussion at this time.    Social History     Tobacco Use     Smoking status: Never Smoker     Smokeless tobacco: Never Used   Substance Use Topics     Alcohol use: Yes     If you drink alcohol do you typically have >3 drinks per day or >7 drinks per week? No    Alcohol Use 9/17/2021   Prescreen: >3 drinks/day or >7 drinks/week? No   Prescreen: >3 drinks/day or >7 drinks/week? -       Last PSA: No results found for: PSA    Reviewed orders with patient. Reviewed health maintenance and updated orders accordingly -  "Yes  Lab work is in process    Reviewed and updated as needed this visit by clinical staff  Tobacco  Allergies  Meds   Med Hx  Surg Hx  Fam Hx  Soc Hx        Reviewed and updated as needed this visit by Provider        Fam Hx           Review of Systems   Constitutional: Negative for chills and fever.   HENT: Negative for congestion, ear pain, hearing loss and sore throat.    Eyes: Negative for pain and visual disturbance.   Respiratory: Negative for cough and shortness of breath.    Cardiovascular: Negative for chest pain, palpitations and peripheral edema.   Gastrointestinal: Negative for abdominal pain, constipation, diarrhea, heartburn, hematochezia and nausea.   Genitourinary: Negative for discharge, dysuria, frequency, genital sores, hematuria, impotence and urgency.   Musculoskeletal: Negative for arthralgias, joint swelling and myalgias.   Skin: Negative for rash.   Neurological: Negative for dizziness, weakness, headaches and paresthesias.   Psychiatric/Behavioral: Negative for mood changes. The patient is not nervous/anxious.        OBJECTIVE:   /74 (BP Location: Right arm, Patient Position: Chair, Cuff Size: Adult Large)   Pulse 72   Temp 97.3  F (36.3  C) (Oral)   Resp 14   Ht 1.962 m (6' 5.25\")   Wt 107.4 kg (236 lb 11.2 oz)   SpO2 97%   BMI 27.89 kg/m      Physical Exam  GENERAL: healthy, alert and no distress  EYES: Eyes grossly normal to inspection, PERRL and conjunctivae and sclerae normal  HENT: ear canals and TM's normal, nose and mouth without ulcers or lesions  NECK: no adenopathy, no asymmetry, masses, or scars and thyroid normal to palpation  RESP: lungs clear to auscultation - no rales, rhonchi or wheezes  CV: regular rate and rhythm, normal S1 S2, no S3 or S4, no murmur, click or rub, no peripheral edema and peripheral pulses strong  ABDOMEN: soft, nontender, no hepatosplenomegaly, no masses and bowel sounds normal  MS: no gross musculoskeletal defects noted, no " "edema  SKIN: no suspicious lesions or rashes  NEURO: Normal strength and tone, mentation intact and speech normal  PSYCH: mentation appears normal, affect normal/bright    Diagnostic Test Results:  Labs reviewed in Epic    ASSESSMENT/PLAN:   1. Routine general medical examination at a health care facility  Reviewed personal and family history. Reviewed age appropriate screenings. Recommended any needed vaccinations. We lost power today and have no vaccines in house. He'll return for the flu shot  - Lipid panel reflex to direct LDL Fasting; Future  - Comprehensive metabolic panel (BMP + Alb, Alk Phos, ALT, AST, Total. Bili, TP); Future    2. Screening for HIV (human immunodeficiency virus)  - HIV Antigen Antibody Combo; Future    3. Need for hepatitis C screening test  - Hepatitis C Screen Reflex to HCV RNA Quant and Genotype; Future      Patient has been advised of split billing requirements and indicates understanding: Yes  COUNSELING:   Reviewed preventive health counseling, as reflected in patient instructions    Estimated body mass index is 27.89 kg/m  as calculated from the following:    Height as of this encounter: 1.962 m (6' 5.25\").    Weight as of this encounter: 107.4 kg (236 lb 11.2 oz).         He reports that he has never smoked. He has never used smokeless tobacco.      Counseling Resources:  ATP IV Guidelines  Pooled Cohorts Equation Calculator  FRAX Risk Assessment  ICSI Preventive Guidelines  Dietary Guidelines for Americans, 2010  USDA's MyPlate  ASA Prophylaxis  Lung CA Screening    MONICA Prasad [facility]  "

## 2021-09-18 LAB
ALBUMIN SERPL-MCNC: 4.5 G/DL (ref 3.4–5)
ALP SERPL-CCNC: 58 U/L (ref 40–150)
ALT SERPL W P-5'-P-CCNC: 25 U/L (ref 0–70)
ANION GAP SERPL CALCULATED.3IONS-SCNC: 5 MMOL/L (ref 3–14)
AST SERPL W P-5'-P-CCNC: 22 U/L (ref 0–45)
BILIRUB SERPL-MCNC: 0.6 MG/DL (ref 0.2–1.3)
BUN SERPL-MCNC: 16 MG/DL (ref 7–30)
CALCIUM SERPL-MCNC: 9.1 MG/DL (ref 8.5–10.1)
CHLORIDE BLD-SCNC: 106 MMOL/L (ref 94–109)
CHOLEST SERPL-MCNC: 238 MG/DL
CO2 SERPL-SCNC: 27 MMOL/L (ref 20–32)
CREAT SERPL-MCNC: 0.89 MG/DL (ref 0.66–1.25)
FASTING STATUS PATIENT QL REPORTED: YES
GFR SERPL CREATININE-BSD FRML MDRD: >90 ML/MIN/1.73M2
GLUCOSE BLD-MCNC: 92 MG/DL (ref 70–99)
HDLC SERPL-MCNC: 46 MG/DL
LDLC SERPL CALC-MCNC: 168 MG/DL
NONHDLC SERPL-MCNC: 192 MG/DL
POTASSIUM BLD-SCNC: 4.1 MMOL/L (ref 3.4–5.3)
PROT SERPL-MCNC: 7.7 G/DL (ref 6.8–8.8)
SODIUM SERPL-SCNC: 138 MMOL/L (ref 133–144)
TRIGL SERPL-MCNC: 121 MG/DL

## 2021-10-09 ENCOUNTER — HEALTH MAINTENANCE LETTER (OUTPATIENT)
Age: 31
End: 2021-10-09

## 2021-12-15 ENCOUNTER — TRANSFERRED RECORDS (OUTPATIENT)
Dept: HEALTH INFORMATION MANAGEMENT | Facility: CLINIC | Age: 31
End: 2021-12-15
Payer: COMMERCIAL

## 2022-06-14 ENCOUNTER — OFFICE VISIT (OUTPATIENT)
Dept: FAMILY MEDICINE | Facility: CLINIC | Age: 32
End: 2022-06-14
Payer: COMMERCIAL

## 2022-06-14 VITALS
DIASTOLIC BLOOD PRESSURE: 74 MMHG | HEART RATE: 64 BPM | SYSTOLIC BLOOD PRESSURE: 110 MMHG | HEIGHT: 77 IN | TEMPERATURE: 97.6 F | BODY MASS INDEX: 28.57 KG/M2 | OXYGEN SATURATION: 98 % | RESPIRATION RATE: 14 BRPM | WEIGHT: 242 LBS

## 2022-06-14 DIAGNOSIS — S46.002A ROTATOR CUFF INJURY, LEFT, INITIAL ENCOUNTER: Primary | ICD-10-CM

## 2022-06-14 PROCEDURE — 99213 OFFICE O/P EST LOW 20 MIN: CPT | Performed by: PHYSICIAN ASSISTANT

## 2022-06-14 RX ORDER — NAPROXEN 500 MG/1
500 TABLET ORAL 2 TIMES DAILY WITH MEALS
Qty: 30 TABLET | Refills: 0 | Status: SHIPPED | OUTPATIENT
Start: 2022-06-14 | End: 2023-12-21

## 2022-06-14 ASSESSMENT — PAIN SCALES - GENERAL: PAINLEVEL: NO PAIN (0)

## 2022-06-14 NOTE — PROGRESS NOTES
"  Assessment & Plan     Rotator cuff injury, left, initial encounter  He will benefit from physical therapy and nsaids. Send note if not improving  - naproxen (NAPROSYN) 500 MG tablet; Take 1 tablet (500 mg) by mouth 2 times daily (with meals)  - Physical Therapy Referral; Future       BMI:   Estimated body mass index is 28.51 kg/m  as calculated from the following:    Height as of this encounter: 1.962 m (6' 5.25\").    Weight as of this encounter: 109.8 kg (242 lb).         Return in about 4 weeks (around 7/12/2022) for send note.    MONICA Prasad [facility] JOSE E Rosen is a 32 year old who presents for the following health issues     Musculoskeletal Problem    History of Present Illness       Reason for visit:  Shoulder pain.  Symptom onset:  More than a month  Symptoms include:  Pain after repetitive motions, unable to lift arm shoulder at times, weakness, general discomfort.  Symptom intensity:  Moderate  Symptom progression:  Staying the same  Had these symptoms before:  No  What makes it worse:  Activity and wrong movement, sleeping on it causes pain.  What makes it better:  Ibuprofen and rest.    He eats 2-3 servings of fruits and vegetables daily.He consumes 0 sweetened beverage(s) daily.He exercises with enough effort to increase his heart rate 30 to 60 minutes per day.  He exercises with enough effort to increase his heart rate 5 days per week.   He is taking medications regularly.     Silas De Santiago is a 32 year old male who presents today for ongoing left shoulder pain  There was no specific injury but after dethaching his yard he has had the pain  Since that day he has had difficulty sleeping on the arm  Anything repetitive causes limitation for the next 3-4 days  But there is still weakness when he is limiting  Occasionally some hand discomfort      Review of Systems   Constitutional, HEENT, cardiovascular, pulmonary, gi and gu systems are negative, " "except as otherwise noted.      Objective    /74 (BP Location: Right arm, Patient Position: Sitting, Cuff Size: Adult Large)   Pulse 64   Temp 97.6  F (36.4  C) (Oral)   Resp 14   Ht 1.962 m (6' 5.25\")   Wt 109.8 kg (242 lb)   SpO2 98%   BMI 28.51 kg/m    Body mass index is 28.51 kg/m .  Physical Exam   GENERAL: healthy, alert and no distress  MS: the left shoulder shows no rash, swelling or warmth. ROM is limited with arc test at just above 90. Empty can negative. Push off is weak.             "

## 2022-09-11 ENCOUNTER — HEALTH MAINTENANCE LETTER (OUTPATIENT)
Age: 32
End: 2022-09-11

## 2022-12-20 ENCOUNTER — OFFICE VISIT (OUTPATIENT)
Dept: FAMILY MEDICINE | Facility: CLINIC | Age: 32
End: 2022-12-20
Payer: COMMERCIAL

## 2022-12-20 VITALS
TEMPERATURE: 97.5 F | RESPIRATION RATE: 14 BRPM | BODY MASS INDEX: 27.42 KG/M2 | SYSTOLIC BLOOD PRESSURE: 104 MMHG | HEART RATE: 61 BPM | OXYGEN SATURATION: 98 % | DIASTOLIC BLOOD PRESSURE: 60 MMHG | HEIGHT: 78 IN | WEIGHT: 237 LBS

## 2022-12-20 DIAGNOSIS — Z00.00 ROUTINE GENERAL MEDICAL EXAMINATION AT A HEALTH CARE FACILITY: Primary | ICD-10-CM

## 2022-12-20 LAB
ALBUMIN SERPL BCG-MCNC: 5.1 G/DL (ref 3.5–5.2)
ALP SERPL-CCNC: 50 U/L (ref 40–129)
ALT SERPL W P-5'-P-CCNC: 13 U/L (ref 10–50)
ANION GAP SERPL CALCULATED.3IONS-SCNC: 11 MMOL/L (ref 7–15)
AST SERPL W P-5'-P-CCNC: 25 U/L (ref 10–50)
BILIRUB SERPL-MCNC: 0.6 MG/DL
BUN SERPL-MCNC: 15.4 MG/DL (ref 6–20)
CALCIUM SERPL-MCNC: 9.8 MG/DL (ref 8.6–10)
CHLORIDE SERPL-SCNC: 101 MMOL/L (ref 98–107)
CHOLEST SERPL-MCNC: 211 MG/DL
CREAT SERPL-MCNC: 0.93 MG/DL (ref 0.67–1.17)
DEPRECATED HCO3 PLAS-SCNC: 26 MMOL/L (ref 22–29)
GFR SERPL CREATININE-BSD FRML MDRD: >90 ML/MIN/1.73M2
GLUCOSE SERPL-MCNC: 94 MG/DL (ref 70–99)
HDLC SERPL-MCNC: 48 MG/DL
LDLC SERPL CALC-MCNC: 133 MG/DL
NONHDLC SERPL-MCNC: 163 MG/DL
POTASSIUM SERPL-SCNC: 4.4 MMOL/L (ref 3.4–5.3)
PROT SERPL-MCNC: 7.3 G/DL (ref 6.4–8.3)
SODIUM SERPL-SCNC: 138 MMOL/L (ref 136–145)
TRIGL SERPL-MCNC: 152 MG/DL

## 2022-12-20 PROCEDURE — 36415 COLL VENOUS BLD VENIPUNCTURE: CPT | Performed by: PHYSICIAN ASSISTANT

## 2022-12-20 PROCEDURE — 80061 LIPID PANEL: CPT | Performed by: PHYSICIAN ASSISTANT

## 2022-12-20 PROCEDURE — 80053 COMPREHEN METABOLIC PANEL: CPT | Performed by: PHYSICIAN ASSISTANT

## 2022-12-20 PROCEDURE — 99395 PREV VISIT EST AGE 18-39: CPT | Performed by: PHYSICIAN ASSISTANT

## 2022-12-20 ASSESSMENT — ENCOUNTER SYMPTOMS
WEAKNESS: 0
ABDOMINAL PAIN: 0
HEMATOCHEZIA: 0
JOINT SWELLING: 0
PARESTHESIAS: 0
DYSURIA: 0
FREQUENCY: 0
DIZZINESS: 0
MYALGIAS: 0
CONSTIPATION: 0
HEADACHES: 0
DIARRHEA: 0
NERVOUS/ANXIOUS: 0
HEARTBURN: 0
EYE PAIN: 0
SORE THROAT: 0
FEVER: 0
CHILLS: 0
PALPITATIONS: 0
HEMATURIA: 0
COUGH: 0
SHORTNESS OF BREATH: 0
ARTHRALGIAS: 0
NAUSEA: 0

## 2022-12-20 NOTE — PROGRESS NOTES
SUBJECTIVE:   CC: Silas is an 32 year old who presents for preventative health visit.     Patient has been advised of split billing requirements and indicates understanding: Yes  Healthy Habits:     Getting at least 3 servings of Calcium per day:  Yes    Bi-annual eye exam:  Yes    Dental care twice a year:  Yes    Sleep apnea or symptoms of sleep apnea:  None    Diet:  Regular (no restrictions)    Frequency of exercise:  4-5 days/week    Duration of exercise:  30-45 minutes    Taking medications regularly:  Yes    Medication side effects:  Not applicable    PHQ-2 Total Score: 0    Additional concerns today:  Yes    Silas De Santiago is a 32 year old male who presents today for annual wellness  Completed [employer] in fall and planning on [place] next year    Today's PHQ-2 Score:   PHQ-2 ( 1999 Pfizer) 12/20/2022   Q1: Little interest or pleasure in doing things 0   Q2: Feeling down, depressed or hopeless 0   PHQ-2 Score 0   PHQ-2 Total Score (12-17 Years)- Positive if 3 or more points; Administer PHQ-A if positive -   Q1: Little interest or pleasure in doing things Not at all   Q2: Feeling down, depressed or hopeless Not at all   PHQ-2 Score 0           Social History     Tobacco Use     Smoking status: Never     Smokeless tobacco: Never   Substance Use Topics     Alcohol use: Yes     If you drink alcohol do you typically have >3 drinks per day or >7 drinks per week? Yes    Alcohol Use 12/20/2022   Prescreen: >3 drinks/day or >7 drinks/week? Yes   Prescreen: >3 drinks/day or >7 drinks/week? -   AUDIT SCORE  6       Last PSA: No results found for: PSA    Reviewed orders with patient. Reviewed health maintenance and updated orders accordingly - Yes  Lab work is in process  Labs reviewed in EPIC    Reviewed and updated as needed this visit by clinical staff   Tobacco  Allergies  Meds              Reviewed and updated as needed this visit by Provider                   Review of Systems   Constitutional:  "Negative for chills and fever.   HENT: Negative for congestion, ear pain, hearing loss and sore throat.    Eyes: Negative for pain and visual disturbance.   Respiratory: Negative for cough and shortness of breath.    Cardiovascular: Negative for chest pain, palpitations and peripheral edema.   Gastrointestinal: Negative for abdominal pain, constipation, diarrhea, heartburn, hematochezia and nausea.   Genitourinary: Negative for dysuria, frequency, genital sores, hematuria, impotence, penile discharge and urgency.   Musculoskeletal: Negative for arthralgias, joint swelling and myalgias.   Skin: Negative for rash.   Neurological: Negative for dizziness, weakness, headaches and paresthesias.   Psychiatric/Behavioral: Negative for mood changes. The patient is not nervous/anxious.          OBJECTIVE:   /60 (BP Location: Right arm, Patient Position: Sitting, Cuff Size: Adult Large)   Pulse 61   Temp 97.5  F (36.4  C) (Oral)   Resp 14   Ht 1.969 m (6' 5.5\")   Wt 107.5 kg (237 lb)   SpO2 98%   BMI 27.74 kg/m      Physical Exam  GENERAL: healthy, alert and no distress  EYES: Eyes grossly normal to inspection, PERRL and conjunctivae and sclerae normal  HENT: ear canals and TM's normal, nose and mouth without ulcers or lesions  NECK: no adenopathy, no asymmetry, masses, or scars and thyroid normal to palpation  RESP: lungs clear to auscultation - no rales, rhonchi or wheezes  CV: regular rate and rhythm, normal S1 S2, no S3 or S4, no murmur, click or rub, no peripheral edema and peripheral pulses strong  ABDOMEN: soft, nontender, no hepatosplenomegaly, no masses and bowel sounds normal  MS: no gross musculoskeletal defects noted, no edema  NEURO: Normal strength and tone, mentation intact and speech normal  PSYCH: mentation appears normal, affect normal/bright    Diagnostic Test Results:  Labs reviewed in Epic  Updating today    ASSESSMENT/PLAN:   1. Routine general medical examination at a [facility] " "facility  Reviewed personal and family history. Reviewed age appropriate screenings. Recommended any needed vaccinations. He'll get the influenza vaccine following holidays. We're monitoring lipids.   - REVIEW OF HEALTH MAINTENANCE PROTOCOL ORDERS  - Lipid panel reflex to direct LDL Fasting; Future  - Comprehensive metabolic panel (BMP + Alb, Alk Phos, ALT, AST, Total. Bili, TP); Future  - Lipid panel reflex to direct LDL Fasting  - Comprehensive metabolic panel (BMP + Alb, Alk Phos, ALT, AST, Total. Bili, TP)        COUNSELING:   Reviewed preventive health counseling, as reflected in patient instructions      BMI:   Estimated body mass index is 27.74 kg/m  as calculated from the following:    Height as of this encounter: 1.969 m (6' 5.5\").    Weight as of this encounter: 107.5 kg (237 lb).         He reports that he has never smoked. He has never used smokeless tobacco.            MONICA Prasad [facility]  "

## 2023-03-14 ENCOUNTER — TRANSFERRED RECORDS (OUTPATIENT)
Dept: HEALTH INFORMATION MANAGEMENT | Facility: CLINIC | Age: 33
End: 2023-03-14

## 2023-05-22 ENCOUNTER — MYC MEDICAL ADVICE (OUTPATIENT)
Dept: FAMILY MEDICINE | Facility: CLINIC | Age: 33
End: 2023-05-22
Payer: COMMERCIAL

## 2023-12-21 ENCOUNTER — OFFICE VISIT (OUTPATIENT)
Dept: FAMILY MEDICINE | Facility: CLINIC | Age: 33
End: 2023-12-21
Payer: COMMERCIAL

## 2023-12-21 ENCOUNTER — MYC MEDICAL ADVICE (OUTPATIENT)
Dept: FAMILY MEDICINE | Facility: CLINIC | Age: 33
End: 2023-12-21

## 2023-12-21 VITALS
HEART RATE: 68 BPM | WEIGHT: 236.9 LBS | RESPIRATION RATE: 13 BRPM | HEIGHT: 78 IN | DIASTOLIC BLOOD PRESSURE: 64 MMHG | TEMPERATURE: 98 F | BODY MASS INDEX: 27.41 KG/M2 | OXYGEN SATURATION: 98 % | SYSTOLIC BLOOD PRESSURE: 104 MMHG

## 2023-12-21 DIAGNOSIS — Z00.00 ROUTINE GENERAL MEDICAL EXAMINATION AT A HEALTH CARE FACILITY: Primary | ICD-10-CM

## 2023-12-21 DIAGNOSIS — D17.30 LIPOMA OF SKIN AND SUBCUTANEOUS TISSUE: ICD-10-CM

## 2023-12-21 DIAGNOSIS — Z82.49 FAMILY HISTORY OF HYPERTROPHIC CARDIOMYOPATHY: Primary | ICD-10-CM

## 2023-12-21 LAB
ANION GAP SERPL CALCULATED.3IONS-SCNC: 9 MMOL/L (ref 7–15)
BUN SERPL-MCNC: 17.6 MG/DL (ref 6–20)
CALCIUM SERPL-MCNC: 9.9 MG/DL (ref 8.6–10)
CHLORIDE SERPL-SCNC: 103 MMOL/L (ref 98–107)
CHOLEST SERPL-MCNC: 182 MG/DL
CREAT SERPL-MCNC: 0.89 MG/DL (ref 0.67–1.17)
DEPRECATED HCO3 PLAS-SCNC: 27 MMOL/L (ref 22–29)
EGFRCR SERPLBLD CKD-EPI 2021: >90 ML/MIN/1.73M2
FASTING STATUS PATIENT QL REPORTED: NO
GLUCOSE SERPL-MCNC: 95 MG/DL (ref 70–99)
HDLC SERPL-MCNC: 40 MG/DL
LDLC SERPL CALC-MCNC: 121 MG/DL
NONHDLC SERPL-MCNC: 142 MG/DL
POTASSIUM SERPL-SCNC: 4.7 MMOL/L (ref 3.4–5.3)
SODIUM SERPL-SCNC: 139 MMOL/L (ref 135–145)
TRIGL SERPL-MCNC: 103 MG/DL

## 2023-12-21 PROCEDURE — 80048 BASIC METABOLIC PNL TOTAL CA: CPT | Performed by: PHYSICIAN ASSISTANT

## 2023-12-21 PROCEDURE — 99395 PREV VISIT EST AGE 18-39: CPT | Performed by: PHYSICIAN ASSISTANT

## 2023-12-21 PROCEDURE — 80061 LIPID PANEL: CPT | Performed by: PHYSICIAN ASSISTANT

## 2023-12-21 PROCEDURE — 36415 COLL VENOUS BLD VENIPUNCTURE: CPT | Performed by: PHYSICIAN ASSISTANT

## 2023-12-21 RX ORDER — DOXYCYCLINE HYCLATE 100 MG
100 TABLET ORAL
COMMUNITY
Start: 2023-12-15 | End: 2023-12-25

## 2023-12-21 ASSESSMENT — ENCOUNTER SYMPTOMS
SORE THROAT: 0
ARTHRALGIAS: 0
COUGH: 0
DYSURIA: 0
JOINT SWELLING: 0
CHILLS: 0
HEMATURIA: 0
HEMATOCHEZIA: 0
WEAKNESS: 0
SHORTNESS OF BREATH: 0
ABDOMINAL PAIN: 0
CONSTIPATION: 0
PARESTHESIAS: 0
DIARRHEA: 0
HEADACHES: 0
EYE PAIN: 0
MYALGIAS: 0
HEARTBURN: 0
NAUSEA: 0
DIZZINESS: 0
PALPITATIONS: 0
FREQUENCY: 0
NERVOUS/ANXIOUS: 0
FEVER: 0

## 2023-12-21 ASSESSMENT — PAIN SCALES - GENERAL: PAINLEVEL: NO PAIN (0)

## 2023-12-21 NOTE — PROGRESS NOTES
"SUBJECTIVE:   Silas is a 33 year old, presenting for the following:  Physical        12/21/2023     6:49 AM   Additional Questions   Roomed by MR   Accompanied by NA         12/21/2023     6:49 AM   Patient Reported Additional Medications   Patient reports taking the following new medications NA       Healthy Habits:     Getting at least 3 servings of Calcium per day:  Yes    Bi-annual eye exam:  Yes    Dental care twice a year:  Yes    Sleep apnea or symptoms of sleep apnea:  None    Diet:  Regular (no restrictions)    Frequency of exercise:  6-7 days/week    Duration of exercise:  45-60 minutes    Taking medications regularly:  Yes    Medication side effects:  Not applicable    Additional concerns today:  No      Concerns:    Cyst on back of neck. Has been removed once before. Wife always comments on it      Today's PHQ-2 Score:       12/21/2023     6:48 AM   PHQ-2 ( 1999 Pfizer)   Q1: Little interest or pleasure in doing things 0   Q2: Feeling down, depressed or hopeless 0   PHQ-2 Score 0   Q1: Little interest or pleasure in doing things Not at all   Q2: Feeling down, depressed or hopeless Not at all   PHQ-2 Score 0             Social History     Tobacco Use    Smoking status: Never    Smokeless tobacco: Never   Substance Use Topics    Alcohol use: Yes             12/21/2023     6:48 AM   Alcohol Use   Prescreen: >3 drinks/day or >7 drinks/week? No       Last PSA: No results found for: \"PSA\"    Reviewed orders with patient. Reviewed health maintenance and updated orders accordingly - Yes  Lab work is in process  Labs reviewed in EPIC    Reviewed and updated as needed this visit by clinical staff   Tobacco  Allergies  Meds              Reviewed and updated as needed this visit by Provider                   Review of Systems   Constitutional:  Negative for chills and fever.   HENT:  Negative for congestion, ear pain, hearing loss and sore throat.    Eyes:  Negative for pain and visual disturbance. " "  Respiratory:  Negative for cough and shortness of breath.    Cardiovascular:  Negative for chest pain, palpitations and peripheral edema.   Gastrointestinal:  Negative for abdominal pain, constipation, diarrhea, heartburn, hematochezia and nausea.   Genitourinary:  Negative for dysuria, frequency, genital sores, hematuria, impotence, penile discharge and urgency.   Musculoskeletal:  Negative for arthralgias, joint swelling and myalgias.   Skin:  Negative for rash.   Neurological:  Negative for dizziness, weakness, headaches and paresthesias.   Psychiatric/Behavioral:  Negative for mood changes. The patient is not nervous/anxious.          OBJECTIVE:   /64 (BP Location: Right arm, Patient Position: Sitting, Cuff Size: Adult Large)   Pulse 68   Temp 98  F (36.7  C) (Oral)   Resp 13   Ht 1.969 m (6' 5.5\")   Wt 107.5 kg (236 lb 14.4 oz)   SpO2 98%   BMI 27.73 kg/m      Physical Exam  GENERAL: healthy, alert and no distress  EYES: Eyes grossly normal to inspection, PERRL and conjunctivae and sclerae normal  HENT: ear canals and TM's normal, nose and mouth without ulcers or lesions  NECK: no adenopathy, no asymmetry, masses, or scars and thyroid normal to palpation  RESP: lungs clear to auscultation - no rales, rhonchi or wheezes  CV: regular rate and rhythm, normal S1 S2, no S3 or S4, no murmur, click or rub, no peripheral edema and peripheral pulses strong  ABDOMEN: soft, nontender, no hepatosplenomegaly, no masses and bowel sounds normal  MS: no gross musculoskeletal defects noted, no edema  SKIN: small mobile soft subcutaneous nodule to the occiput  PSYCH: mentation appears normal, affect normal/bright    Diagnostic Test Results:  Labs reviewed in Epic  Updating     ASSESSMENT/PLAN:   1. Routine general medical examination at a health care facility  - REVIEW OF HEALTH MAINTENANCE PROTOCOL ORDERS  - PRIMARY CARE FOLLOW-UP SCHEDULING; Future  - Lipid panel reflex to direct LDL Fasting; Future  - Basic " metabolic panel  (Ca, Cl, CO2, Creat, Gluc, K, Na, BUN); Future  - Lipid panel reflex to direct LDL Fasting  - Basic metabolic panel  (Ca, Cl, CO2, Creat, Gluc, K, Na, BUN)    2. Lipoma of skin and subcutaneous tissue  This has recurred. Not bothering him, no infection. He can consider repeat removal if desired  - Adult Dermatology  Referral; Future          COUNSELING:   Reviewed preventive health counseling, as reflected in patient instructions        He reports that he has never smoked. He has never used smokeless tobacco.            MONICA Prasad [facility]

## 2023-12-29 ENCOUNTER — HOSPITAL ENCOUNTER (OUTPATIENT)
Dept: CARDIOLOGY | Facility: CLINIC | Age: 33
Discharge: HOME | End: 2023-12-29
Attending: PHYSICIAN ASSISTANT | Admitting: PHYSICIAN ASSISTANT
Payer: COMMERCIAL

## 2023-12-29 DIAGNOSIS — Z82.49 FAMILY HISTORY OF HYPERTROPHIC CARDIOMYOPATHY: ICD-10-CM

## 2023-12-29 LAB — LVEF ECHO: NORMAL

## 2023-12-29 PROCEDURE — 93306 TTE W/DOPPLER COMPLETE: CPT | Mod: 26 | Performed by: INTERNAL MEDICINE

## 2023-12-29 PROCEDURE — 93306 TTE W/DOPPLER COMPLETE: CPT

## 2024-03-26 NOTE — TELEPHONE ENCOUNTER
Video visit scheduled for 4/15.  Cristela Grande CMA ([facility])    
Will forward to Pollo Alaniz for his review - see pts mychart.  Advised to do a phone or video visit.    
Unable to answer due to medical condition/unresponsive/etc...

## 2024-08-30 ENCOUNTER — OFFICE VISIT (OUTPATIENT)
Dept: FAMILY MEDICINE | Facility: CLINIC | Age: 34
End: 2024-08-30
Payer: COMMERCIAL

## 2024-08-30 VITALS
OXYGEN SATURATION: 99 % | HEART RATE: 47 BPM | DIASTOLIC BLOOD PRESSURE: 70 MMHG | RESPIRATION RATE: 18 BRPM | HEIGHT: 78 IN | SYSTOLIC BLOOD PRESSURE: 106 MMHG | WEIGHT: 233 LBS | TEMPERATURE: 98 F | BODY MASS INDEX: 26.96 KG/M2

## 2024-08-30 DIAGNOSIS — Z00.00 ROUTINE GENERAL MEDICAL EXAMINATION AT A HEALTH CARE FACILITY: Primary | ICD-10-CM

## 2024-08-30 DIAGNOSIS — M77.01 MEDIAL EPICONDYLITIS OF ELBOW, RIGHT: ICD-10-CM

## 2024-08-30 LAB
ANION GAP SERPL CALCULATED.3IONS-SCNC: 11 MMOL/L (ref 7–15)
BUN SERPL-MCNC: 17.4 MG/DL (ref 6–20)
CALCIUM SERPL-MCNC: 9.9 MG/DL (ref 8.8–10.4)
CHLORIDE SERPL-SCNC: 101 MMOL/L (ref 98–107)
CHOLEST SERPL-MCNC: 208 MG/DL
CREAT SERPL-MCNC: 0.98 MG/DL (ref 0.67–1.17)
EGFRCR SERPLBLD CKD-EPI 2021: >90 ML/MIN/1.73M2
FASTING STATUS PATIENT QL REPORTED: YES
FASTING STATUS PATIENT QL REPORTED: YES
GLUCOSE SERPL-MCNC: 96 MG/DL (ref 70–99)
HCO3 SERPL-SCNC: 26 MMOL/L (ref 22–29)
HDLC SERPL-MCNC: 50 MG/DL
LDLC SERPL CALC-MCNC: 137 MG/DL
NONHDLC SERPL-MCNC: 158 MG/DL
POTASSIUM SERPL-SCNC: 4.5 MMOL/L (ref 3.4–5.3)
SODIUM SERPL-SCNC: 138 MMOL/L (ref 135–145)
TRIGL SERPL-MCNC: 104 MG/DL

## 2024-08-30 PROCEDURE — 80048 BASIC METABOLIC PNL TOTAL CA: CPT | Performed by: PHYSICIAN ASSISTANT

## 2024-08-30 PROCEDURE — 99395 PREV VISIT EST AGE 18-39: CPT | Performed by: PHYSICIAN ASSISTANT

## 2024-08-30 PROCEDURE — 36415 COLL VENOUS BLD VENIPUNCTURE: CPT | Performed by: PHYSICIAN ASSISTANT

## 2024-08-30 PROCEDURE — 80061 LIPID PANEL: CPT | Performed by: PHYSICIAN ASSISTANT

## 2024-08-30 RX ORDER — NAPROXEN 500 MG/1
500 TABLET ORAL 2 TIMES DAILY WITH MEALS
Qty: 30 TABLET | Refills: 0 | Status: SHIPPED | OUTPATIENT
Start: 2024-08-30

## 2024-08-30 SDOH — HEALTH STABILITY: PHYSICAL HEALTH: ON AVERAGE, HOW MANY DAYS PER WEEK DO YOU ENGAGE IN MODERATE TO STRENUOUS EXERCISE (LIKE A BRISK WALK)?: 7 DAYS

## 2024-08-30 ASSESSMENT — SOCIAL DETERMINANTS OF HEALTH (SDOH): HOW OFTEN DO YOU GET TOGETHER WITH FRIENDS OR RELATIVES?: MORE THAN THREE TIMES A WEEK

## 2024-08-30 ASSESSMENT — PAIN SCALES - GENERAL: PAINLEVEL: NO PAIN (0)

## 2024-08-30 NOTE — PROGRESS NOTES
"Preventive Care Visit  [facility] ADOLFOJERRELL  Miki Alaniz PA-C, Family Medicine  Aug 30, 2024      Assessment & Plan     Routine general medical examination at a health care facility  Reviewed personal and family history. Reviewed age appropriate screenings. Recommended any needed vaccinations. Pollo is doing excellent - he is doing the [place] on 9/8 and feeling good.   - Lipid panel reflex to direct LDL Fasting; Future  - Basic metabolic panel  (Ca, Cl, CO2, Creat, Gluc, K, Na, BUN); Future  - Lipid panel reflex to direct LDL Fasting  - Basic metabolic panel  (Ca, Cl, CO2, Creat, Gluc, K, Na, BUN)    Medial epicondylitis of elbow, right  - naproxen (NAPROSYN) 500 MG tablet; Take 1 tablet (500 mg) by mouth 2 times daily (with meals).            BMI  Estimated body mass index is 27.27 kg/m  as calculated from the following:    Height as of this encounter: 1.969 m (6' 5.5\").    Weight as of this encounter: 105.7 kg (233 lb).       Counseling  Appropriate preventive services were addressed with this patient via screening, questionnaire, or discussion as appropriate for fall prevention, nutrition, physical activity, Tobacco-use cessation, social engagement, weight loss and cognition.  Checklist reviewing preventive services available has been given to the patient.  Reviewed patient's diet, addressing concerns and/or questions.   He is at risk for psychosocial distress and has been provided with information to reduce risk.         Bhupinder Rosen is a 34 year old, presenting for the following:  Physical (Would like to have blood work. Is participating in a triathlon in a week.), Elbow Pain (Ongoing for about 2-3 months), and Otalgia (Possible swimmer's ear)        8/30/2024    10:05 AM   Additional Questions   Roomed by adelina        HPI        8/30/2024   General Health   How would you rate your overall physical health? Excellent   Feel stress (tense, anxious, or unable to sleep) Only a " little      (!) STRESS CONCERN      8/30/2024   Nutrition   Three or more servings of calcium each day? Yes   Diet: Regular (no restrictions)   How many servings of fruit and vegetables per day? 4 or more   How many sweetened beverages each day? 0-1            8/30/2024   Exercise   Days per week of moderate/strenous exercise 7 days            8/30/2024   Social Factors   Frequency of gathering with friends or relatives More than three times a week   Worry food won't last until get money to buy more No   Food not last or not have enough money for food? No   Do you have housing? (Housing is defined as stable permanent housing and does not include staying ouside in a car, in a tent, in an abandoned building, in an overnight shelter, or couch-surfing.) Yes   Are you worried about losing your housing? No   Lack of transportation? No   Unable to get utilities (heat,electricity)? No            8/30/2024   Dental   Dentist two times every year? Yes            8/30/2024   TB Screening   Were you born outside of the [place]? No            Today's PHQ-2 Score:       8/30/2024    10:04 AM   PHQ-2 ( 1999 Pfizer)   Q1: Little interest or pleasure in doing things 0   Q2: Feeling down, depressed or hopeless 0   PHQ-2 Score 0   Q1: Little interest or pleasure in doing things Not at all   Q2: Feeling down, depressed or hopeless Not at all   PHQ-2 Score 0           8/30/2024   Substance Use   Alcohol more than 3/day or more than 7/wk No   Do you use any other substances recreationally? (!) CANNABIS PRODUCTS        Social History     Tobacco Use    Smoking status: Never    Smokeless tobacco: Never   Vaping Use    Vaping status: Never Used   Substance Use Topics    Alcohol use: Yes    Drug use: No           8/30/2024   STI Screening   New sexual partner(s) since last STI/HIV test? No            8/30/2024   Contraception/Family Planning   Questions about contraception or family planning No           Reviewed and updated as needed this visit  "by Provider                    Lab work is in process  Labs reviewed in EPIC      Review of Systems  Constitutional, HEENT, cardiovascular, pulmonary, gi and gu systems are negative, except as otherwise noted.     Objective    Exam  /70   Pulse (!) 47   Temp 98  F (36.7  C)   Resp 18   Ht 1.969 m (6' 5.5\")   Wt 105.7 kg (233 lb)   SpO2 99%   BMI 27.27 kg/m     Estimated body mass index is 27.27 kg/m  as calculated from the following:    Height as of this encounter: 1.969 m (6' 5.5\").    Weight as of this encounter: 105.7 kg (233 lb).    Physical Exam  GENERAL: alert and no distress  EYES: Eyes grossly normal to inspection, PERRL and conjunctivae and sclerae normal  HENT: ear canals and TM's normal, nose and mouth without ulcers or lesions  NECK: no adenopathy, no asymmetry, masses, or scars  RESP: lungs clear to auscultation - no rales, rhonchi or wheezes  CV: regular rate and rhythm, normal S1 S2, no S3 or S4, no murmur, click or rub, no peripheral edema  ABDOMEN: soft, nontender, no hepatosplenomegaly, no masses and bowel sounds normal  MS: medial epicondylar space in the right elbow slightly tender to palpation and with motion against resistance  SKIN: no suspicious lesions or rashes  PSYCH: mentation appears normal, affect normal/bright        Signed Electronically by: Miki Alaniz PA-C    "

## 2024-08-30 NOTE — PATIENT INSTRUCTIONS
Patient Education   Preventive Care Advice   This is general advice given by our system to help you stay healthy. However, your care team may have specific advice just for you. Please talk to your care team about your preventive care needs.  Nutrition  Eat 5 or more servings of fruits and vegetables each day.  Try wheat bread, brown rice and whole grain pasta (instead of white bread, rice, and pasta).  Get enough calcium and vitamin D. Check the label on foods and aim for 100% of the RDA (recommended daily allowance).  Lifestyle  Exercise at least 150 minutes each week  (30 minutes a day, 5 days a week).  Do muscle strengthening activities 2 days a week. These help control your weight and prevent disease.  No smoking.  Wear sunscreen to prevent skin cancer.  Have a dental exam and cleaning every 6 months.  Yearly exams  See your health care team every year to talk about:  Any changes in your health.  Any medicines your care team has prescribed.  Preventive care, family planning, and ways to prevent chronic diseases.  Shots (vaccines)   HPV shots (up to age 26), if you've never had them before.  Hepatitis B shots (up to age 59), if you've never had them before.  COVID-19 shot: Get this shot when it's due.  Flu shot: Get a flu shot every year.  Tetanus shot: Get a tetanus shot every 10 years.  Pneumococcal, hepatitis A, and RSV shots: Ask your care team if you need these based on your risk.  Shingles shot (for age 50 and up)  General health tests  Diabetes screening:  Starting at age 35, Get screened for diabetes at least every 3 years.  If you are younger than age 35, ask your care team if you should be screened for diabetes.  Cholesterol test: At age 39, start having a cholesterol test every 5 years, or more often if advised.  Bone density scan (DEXA): At age 50, ask your care team if you should have this scan for osteoporosis (brittle bones).  Hepatitis C: Get tested at least once in your life.  STIs (sexually  transmitted infections)  Before age 24: Ask your care team if you should be screened for STIs.  After age 24: Get screened for STIs if you're at risk. You are at risk for STIs (including HIV) if:  You are sexually active with more than one person.  You don't use condoms every time.  You or a partner was diagnosed with a sexually transmitted infection.  If you are at risk for HIV, ask about PrEP medicine to prevent HIV.  Get tested for HIV at least once in your life, whether you are at risk for HIV or not.  Cancer screening tests  Cervical cancer screening: If you have a cervix, begin getting regular cervical cancer screening tests starting at age 21.  Breast cancer scan (mammogram): If you've ever had breasts, begin having regular mammograms starting at age 40. This is a scan to check for breast cancer.  Colon cancer screening: It is important to start screening for colon cancer at age 45.  Have a colonoscopy test every 10 years (or more often if you're at risk) Or, ask your provider about stool tests like a FIT test every year or Cologuard test every 3 years.  To learn more about your testing options, visit:   .  For help making a decision, visit:   https://bit.ly/jd04863.  Prostate cancer screening test: If you have a prostate, ask your care team if a prostate cancer screening test (PSA) at age 55 is right for you.  Lung cancer screening: If you are a current or former smoker ages 50 to 80, ask your care team if ongoing lung cancer screenings are right for you.  For informational purposes only. Not to replace the advice of your health care provider. Copyright   2023 [employer]. All rights reserved. Clinically reviewed by the [facility] Transitions Program. "[employer] 555014 - REV 01/24.

## 2025-03-26 ENCOUNTER — E-VISIT (OUTPATIENT)
Dept: FAMILY MEDICINE | Facility: CLINIC | Age: 35
End: 2025-03-26
Payer: COMMERCIAL

## 2025-03-26 DIAGNOSIS — A09 TRAVELER'S DIARRHEA: Primary | ICD-10-CM

## 2025-03-27 RX ORDER — AZITHROMYCIN 500 MG/1
500 TABLET, FILM COATED ORAL DAILY
Qty: 3 TABLET | Refills: 0 | Status: SHIPPED | OUTPATIENT
Start: 2025-03-27

## 2025-08-11 ENCOUNTER — PATIENT OUTREACH (OUTPATIENT)
Dept: CARE COORDINATION | Facility: CLINIC | Age: 35
End: 2025-08-11
Payer: COMMERCIAL

## 2025-08-25 ENCOUNTER — PATIENT OUTREACH (OUTPATIENT)
Dept: CARE COORDINATION | Facility: CLINIC | Age: 35
End: 2025-08-25
Payer: COMMERCIAL